# Patient Record
Sex: FEMALE | Race: WHITE | NOT HISPANIC OR LATINO | Employment: FULL TIME | URBAN - METROPOLITAN AREA
[De-identification: names, ages, dates, MRNs, and addresses within clinical notes are randomized per-mention and may not be internally consistent; named-entity substitution may affect disease eponyms.]

---

## 2020-02-13 ENCOUNTER — HOSPITAL ENCOUNTER (INPATIENT)
Facility: HOSPITAL | Age: 65
LOS: 4 days | Discharge: HOME | End: 2020-02-17
Attending: EMERGENCY MEDICINE | Admitting: INTERNAL MEDICINE

## 2020-02-13 DIAGNOSIS — F10.920 ALCOHOLIC INTOXICATION WITHOUT COMPLICATION (HCC): ICD-10-CM

## 2020-02-13 DIAGNOSIS — K92.0 GASTROINTESTINAL HEMORRHAGE WITH HEMATEMESIS: ICD-10-CM

## 2020-02-13 DIAGNOSIS — K92.2 GASTROINTESTINAL HEMORRHAGE, UNSPECIFIED GASTROINTESTINAL HEMORRHAGE TYPE: Primary | ICD-10-CM

## 2020-02-13 PROBLEM — B19.20 HEPATITIS C: Status: ACTIVE | Noted: 2020-02-13

## 2020-02-13 PROBLEM — F10.929 ALCOHOL INTOXICATION (HCC): Status: ACTIVE | Noted: 2020-02-13

## 2020-02-13 PROBLEM — IMO0001 ALCOHOLISM /ALCOHOL ABUSE: Status: ACTIVE | Noted: 2020-02-13

## 2020-02-13 PROBLEM — E07.9 DISEASE OF THYROID GLAND: Status: ACTIVE | Noted: 2020-02-13

## 2020-02-13 LAB
ABO GROUP BLD: NORMAL
ALBUMIN SERPL-MCNC: 4.1 G/DL (ref 3.5–5.2)
ALBUMIN/GLOB SERPL: 1.7 G/DL
ALP SERPL-CCNC: 134 U/L (ref 39–117)
ALT SERPL W P-5'-P-CCNC: 19 U/L (ref 1–33)
ANION GAP SERPL CALCULATED.3IONS-SCNC: 14.5 MMOL/L (ref 5–15)
AST SERPL-CCNC: 32 U/L (ref 1–32)
BASOPHILS # BLD AUTO: 0.04 10*3/MM3 (ref 0–0.2)
BASOPHILS NFR BLD AUTO: 0.5 % (ref 0–1.5)
BILIRUB SERPL-MCNC: <0.2 MG/DL (ref 0.2–1.2)
BLD GP AB SCN SERPL QL: NEGATIVE
BUN BLD-MCNC: 12 MG/DL (ref 8–23)
BUN/CREAT SERPL: 17.6 (ref 7–25)
CALCIUM SPEC-SCNC: 8.7 MG/DL (ref 8.6–10.5)
CHLORIDE SERPL-SCNC: 105 MMOL/L (ref 98–107)
CO2 SERPL-SCNC: 23.5 MMOL/L (ref 22–29)
CREAT BLD-MCNC: 0.68 MG/DL (ref 0.57–1)
D-LACTATE SERPL-SCNC: 1.9 MMOL/L (ref 0.5–2)
D-LACTATE SERPL-SCNC: 2.2 MMOL/L (ref 0.5–2)
DEPRECATED RDW RBC AUTO: 49 FL (ref 37–54)
DEVELOPER EXPIRATION DATE: ABNORMAL
DEVELOPER LOT NUMBER: 128
EOSINOPHIL # BLD AUTO: 0.2 10*3/MM3 (ref 0–0.4)
EOSINOPHIL NFR BLD AUTO: 2.6 % (ref 0.3–6.2)
ERYTHROCYTE [DISTWIDTH] IN BLOOD BY AUTOMATED COUNT: 14.3 % (ref 12.3–15.4)
ETHANOL BLD-MCNC: 275 MG/DL (ref 0–10)
ETHANOL UR QL: 0.28 %
EXPIRATION DATE: ABNORMAL
FECAL OCCULT BLOOD SCREEN, POC: POSITIVE
GFR SERPL CREATININE-BSD FRML MDRD: 87 ML/MIN/1.73
GLOBULIN UR ELPH-MCNC: 2.4 GM/DL
GLUCOSE BLD-MCNC: 90 MG/DL (ref 65–99)
HCT VFR BLD AUTO: 39.3 % (ref 34–46.6)
HGB BLD-MCNC: 13 G/DL (ref 12–15.9)
HOLD SPECIMEN: NORMAL
IMM GRANULOCYTES # BLD AUTO: 0.02 10*3/MM3 (ref 0–0.05)
IMM GRANULOCYTES NFR BLD AUTO: 0.3 % (ref 0–0.5)
INR PPP: 0.88 (ref 0.9–1.1)
LYMPHOCYTES # BLD AUTO: 2.48 10*3/MM3 (ref 0.7–3.1)
LYMPHOCYTES NFR BLD AUTO: 32.3 % (ref 19.6–45.3)
Lab: 128
MCH RBC QN AUTO: 31.6 PG (ref 26.6–33)
MCHC RBC AUTO-ENTMCNC: 33.1 G/DL (ref 31.5–35.7)
MCV RBC AUTO: 95.4 FL (ref 79–97)
MONOCYTES # BLD AUTO: 0.63 10*3/MM3 (ref 0.1–0.9)
MONOCYTES NFR BLD AUTO: 8.2 % (ref 5–12)
NEGATIVE CONTROL: NEGATIVE
NEUTROPHILS # BLD AUTO: 4.31 10*3/MM3 (ref 1.7–7)
NEUTROPHILS NFR BLD AUTO: 56.1 % (ref 42.7–76)
NRBC BLD AUTO-RTO: 0 /100 WBC (ref 0–0.2)
PLATELET # BLD AUTO: 319 10*3/MM3 (ref 140–450)
PMV BLD AUTO: 8.9 FL (ref 6–12)
POSITIVE CONTROL: POSITIVE
POTASSIUM BLD-SCNC: 3.8 MMOL/L (ref 3.5–5.2)
PROT SERPL-MCNC: 6.5 G/DL (ref 6–8.5)
PROTHROMBIN TIME: 11.7 SECONDS (ref 11.7–14.2)
RBC # BLD AUTO: 4.12 10*6/MM3 (ref 3.77–5.28)
RH BLD: POSITIVE
SODIUM BLD-SCNC: 143 MMOL/L (ref 136–145)
T&S EXPIRATION DATE: NORMAL
WBC NRBC COR # BLD: 7.68 10*3/MM3 (ref 3.4–10.8)
WHOLE BLOOD HOLD SPECIMEN: NORMAL
WHOLE BLOOD HOLD SPECIMEN: NORMAL

## 2020-02-13 PROCEDURE — 86900 BLOOD TYPING SEROLOGIC ABO: CPT | Performed by: EMERGENCY MEDICINE

## 2020-02-13 PROCEDURE — 99284 EMERGENCY DEPT VISIT MOD MDM: CPT

## 2020-02-13 PROCEDURE — 83605 ASSAY OF LACTIC ACID: CPT

## 2020-02-13 PROCEDURE — 85025 COMPLETE CBC W/AUTO DIFF WBC: CPT

## 2020-02-13 PROCEDURE — 85610 PROTHROMBIN TIME: CPT | Performed by: EMERGENCY MEDICINE

## 2020-02-13 PROCEDURE — 85018 HEMOGLOBIN: CPT | Performed by: INTERNAL MEDICINE

## 2020-02-13 PROCEDURE — 80053 COMPREHEN METABOLIC PANEL: CPT | Performed by: EMERGENCY MEDICINE

## 2020-02-13 PROCEDURE — 83605 ASSAY OF LACTIC ACID: CPT | Performed by: INTERNAL MEDICINE

## 2020-02-13 PROCEDURE — 80307 DRUG TEST PRSMV CHEM ANLYZR: CPT | Performed by: EMERGENCY MEDICINE

## 2020-02-13 PROCEDURE — 85014 HEMATOCRIT: CPT | Performed by: INTERNAL MEDICINE

## 2020-02-13 PROCEDURE — 25010000002 THIAMINE PER 100 MG: Performed by: INTERNAL MEDICINE

## 2020-02-13 PROCEDURE — 82270 OCCULT BLOOD FECES: CPT | Performed by: EMERGENCY MEDICINE

## 2020-02-13 PROCEDURE — 86901 BLOOD TYPING SEROLOGIC RH(D): CPT | Performed by: EMERGENCY MEDICINE

## 2020-02-13 PROCEDURE — 86850 RBC ANTIBODY SCREEN: CPT | Performed by: EMERGENCY MEDICINE

## 2020-02-13 PROCEDURE — 36415 COLL VENOUS BLD VENIPUNCTURE: CPT

## 2020-02-13 RX ORDER — LORAZEPAM 1 MG/1
1 TABLET ORAL EVERY 6 HOURS
Status: COMPLETED | OUTPATIENT
Start: 2020-02-13 | End: 2020-02-14

## 2020-02-13 RX ORDER — GABAPENTIN 300 MG/1
300 CAPSULE ORAL 3 TIMES DAILY
COMMUNITY

## 2020-02-13 RX ORDER — BUPROPION HYDROCHLORIDE 150 MG/1
300 TABLET ORAL DAILY
COMMUNITY

## 2020-02-13 RX ORDER — PANTOPRAZOLE SODIUM 40 MG/10ML
80 INJECTION, POWDER, LYOPHILIZED, FOR SOLUTION INTRAVENOUS ONCE
Status: COMPLETED | OUTPATIENT
Start: 2020-02-13 | End: 2020-02-13

## 2020-02-13 RX ORDER — THIAMINE MONONITRATE (VIT B1) 100 MG
100 TABLET ORAL ONCE
Status: COMPLETED | OUTPATIENT
Start: 2020-02-13 | End: 2020-02-13

## 2020-02-13 RX ORDER — SODIUM CHLORIDE 0.9 % (FLUSH) 0.9 %
10 SYRINGE (ML) INJECTION AS NEEDED
Status: DISCONTINUED | OUTPATIENT
Start: 2020-02-13 | End: 2020-02-17 | Stop reason: HOSPADM

## 2020-02-13 RX ORDER — LORAZEPAM 2 MG/ML
2 INJECTION INTRAMUSCULAR
Status: DISCONTINUED | OUTPATIENT
Start: 2020-02-13 | End: 2020-02-17 | Stop reason: HOSPADM

## 2020-02-13 RX ORDER — LORAZEPAM 2 MG/ML
1 INJECTION INTRAMUSCULAR
Status: DISCONTINUED | OUTPATIENT
Start: 2020-02-13 | End: 2020-02-17 | Stop reason: HOSPADM

## 2020-02-13 RX ORDER — TRAZODONE HYDROCHLORIDE 100 MG/1
100 TABLET ORAL NIGHTLY
COMMUNITY

## 2020-02-13 RX ORDER — ONDANSETRON 2 MG/ML
4 INJECTION INTRAMUSCULAR; INTRAVENOUS EVERY 6 HOURS PRN
Status: DISCONTINUED | OUTPATIENT
Start: 2020-02-13 | End: 2020-02-17 | Stop reason: HOSPADM

## 2020-02-13 RX ORDER — SODIUM CHLORIDE 9 MG/ML
125 INJECTION, SOLUTION INTRAVENOUS CONTINUOUS
Status: DISCONTINUED | OUTPATIENT
Start: 2020-02-13 | End: 2020-02-14

## 2020-02-13 RX ORDER — LORAZEPAM 1 MG/1
2 TABLET ORAL
Status: DISCONTINUED | OUTPATIENT
Start: 2020-02-13 | End: 2020-02-17 | Stop reason: HOSPADM

## 2020-02-13 RX ORDER — ESCITALOPRAM OXALATE 20 MG/1
20 TABLET ORAL DAILY
COMMUNITY

## 2020-02-13 RX ORDER — LORAZEPAM 1 MG/1
1 TABLET ORAL
Status: DISCONTINUED | OUTPATIENT
Start: 2020-02-13 | End: 2020-02-17 | Stop reason: HOSPADM

## 2020-02-13 RX ORDER — LORAZEPAM 1 MG/1
1 TABLET ORAL EVERY 8 HOURS
Status: DISPENSED | OUTPATIENT
Start: 2020-02-14 | End: 2020-02-15

## 2020-02-13 RX ORDER — SODIUM CHLORIDE 0.9 % (FLUSH) 0.9 %
10 SYRINGE (ML) INJECTION EVERY 12 HOURS SCHEDULED
Status: DISCONTINUED | OUTPATIENT
Start: 2020-02-13 | End: 2020-02-17 | Stop reason: HOSPADM

## 2020-02-13 RX ORDER — NITROGLYCERIN 0.4 MG/1
0.4 TABLET SUBLINGUAL
Status: DISCONTINUED | OUTPATIENT
Start: 2020-02-13 | End: 2020-02-17 | Stop reason: HOSPADM

## 2020-02-13 RX ORDER — NAPROXEN SODIUM 220 MG
440 TABLET ORAL
COMMUNITY
End: 2020-02-17 | Stop reason: HOSPADM

## 2020-02-13 RX ADMIN — SODIUM CHLORIDE, PRESERVATIVE FREE 10 ML: 5 INJECTION INTRAVENOUS at 21:35

## 2020-02-13 RX ADMIN — SODIUM CHLORIDE 1000 ML: 9 INJECTION, SOLUTION INTRAVENOUS at 15:50

## 2020-02-13 RX ADMIN — THIAMINE HYDROCHLORIDE 100 MG: 100 INJECTION, SOLUTION INTRAMUSCULAR; INTRAVENOUS at 22:26

## 2020-02-13 RX ADMIN — PANTOPRAZOLE SODIUM 80 MG: 40 INJECTION, POWDER, FOR SOLUTION INTRAVENOUS at 15:49

## 2020-02-13 RX ADMIN — SODIUM CHLORIDE 8 MG/HR: 900 INJECTION INTRAVENOUS at 21:29

## 2020-02-13 RX ADMIN — LORAZEPAM 1 MG: 1 TABLET ORAL at 21:29

## 2020-02-13 RX ADMIN — SODIUM CHLORIDE 125 ML/HR: 9 INJECTION, SOLUTION INTRAVENOUS at 16:26

## 2020-02-13 RX ADMIN — SODIUM CHLORIDE 8 MG/HR: 900 INJECTION INTRAVENOUS at 15:51

## 2020-02-13 NOTE — ED TRIAGE NOTES
"Patient reported to this triage nurse she is a heavy drinker and her last drink was early this am. Patient reported hx of vomiting blood and now passing blood in her stools. \"   "

## 2020-02-13 NOTE — ED PROVIDER NOTES
" EMERGENCY DEPARTMENT ENCOUNTER    CHIEF COMPLAINT  Chief Complaint: Hematemesis   History given by: Patient   History limited by: Pt is intoxicated   Room Number: 15/15  PMD: Provider, No Known      HPI:  Pt is a 64 y.o. female who presents complaining of bright red hematemesis that began 2 days ago. Pt is unsure of number or frequency of episodes. Pt is also c/o hematochezia, epigastric abdominal pain and light-headedness. Pt denies syncope. She describes the bloody stool as \"dark with black clots.\" Pt denies being anticoagulated. She denies previous similar episodes. Pt reports a fall due to intoxication several months ago and states she has had LLE pain ever since. Pt affirms hx of alcoholism with last drink this morning. She reports past IV drug use but denies any currently.     Duration:  2 days   Onset: Gradual   Timing: Unknown   Location: GI  Radiation: n/a  Quality: Hematemesis   Intensity/Severity: Moderate  Progression: Unchanged   Associated Symptoms: Hematochezia, abd pain, light-headedness   Aggravating Factors: None  Alleviating Factors: None  Previous Episodes: None  Treatment before arrival: None    PAST MEDICAL HISTORY  Active Ambulatory Problems     Diagnosis Date Noted   • No Active Ambulatory Problems     Resolved Ambulatory Problems     Diagnosis Date Noted   • No Resolved Ambulatory Problems     Past Medical History:   Diagnosis Date   • Alcoholic hepatitis    • Disease of thyroid gland    • Hepatitis C    • Hyperlipidemia        PAST SURGICAL HISTORY  History reviewed. No pertinent surgical history.    FAMILY HISTORY  History reviewed. No pertinent family history.    SOCIAL HISTORY  Social History     Socioeconomic History   • Marital status: Single     Spouse name: Not on file   • Number of children: Not on file   • Years of education: Not on file   • Highest education level: Not on file   Substance and Sexual Activity   • Alcohol use: Yes       ALLERGIES  Patient has no known " allergies.    REVIEW OF SYSTEMS  Review of Systems   Constitutional: Negative for fever.   HENT: Negative for sore throat.    Eyes: Negative.    Respiratory: Negative for cough and shortness of breath.    Cardiovascular: Negative for chest pain.   Gastrointestinal: Positive for abdominal pain, blood in stool and vomiting. Negative for diarrhea.   Genitourinary: Negative for dysuria.   Musculoskeletal: Positive for arthralgias. Negative for neck pain.   Skin: Negative for rash.   Allergic/Immunologic: Negative.    Neurological: Positive for light-headedness. Negative for syncope, weakness, numbness and headaches.   Hematological: Negative.    Psychiatric/Behavioral: Negative.    All other systems reviewed and are negative.      PHYSICAL EXAM  ED Triage Vitals   Temp Heart Rate Resp BP SpO2   02/13/20 1351 02/13/20 1351 02/13/20 1351 02/13/20 1428 02/13/20 1351   98.8 °F (37.1 °C) 89 16 129/90 93 %      Temp src Heart Rate Source Patient Position BP Location FiO2 (%)   02/13/20 1351 02/13/20 1351 02/13/20 1428 -- --   Tympanic Monitor Sitting         Physical Exam   Constitutional: No distress.   Smells of alcohol with slurred speech   HENT:   Head: Normocephalic and atraumatic.   Mouth/Throat: Mucous membranes are normal.   Eyes: Pupils are equal, round, and reactive to light.   Neck: Normal range of motion.   Cardiovascular: Normal rate and regular rhythm.   No murmur heard.  Pulmonary/Chest: Effort normal and breath sounds normal. No respiratory distress.   Abdominal: Soft. She exhibits no distension. Bowel sounds are hypoactive. There is tenderness in the epigastric area. There is no rebound and no guarding.   Genitourinary:   Genitourinary Comments: On rectal exam, dark stool that was heme positive (female scribe present for rectal exam)   Musculoskeletal: Normal range of motion. She exhibits no edema.   No pedal edema, L tibial tenderness   Neurological: She is alert. She has normal sensation and normal  strength.   No focal neurological deficits.   Skin: Skin is warm and dry.   Psychiatric: Affect normal.       LAB RESULTS  Lab Results (last 24 hours)     Procedure Component Value Units Date/Time    CBC & Differential [[id number]] Collected:  02/13/20 1438    Specimen:  Blood Updated:  02/13/20 1453    Narrative:       The following orders were created for panel order CBC & Differential.  Procedure                               Abnormality         Status                     ---------                               -----------         ------                     CBC Auto Differential[[id number]]        Normal              Final result                 Please view results for these tests on the individual orders.    Comprehensive Metabolic Panel [[id number]]  (Abnormal) Collected:  02/13/20 1438    Specimen:  Blood Updated:  02/13/20 1516     Glucose 90 mg/dL      BUN 12 mg/dL      Creatinine 0.68 mg/dL      Sodium 143 mmol/L      Potassium 3.8 mmol/L      Chloride 105 mmol/L      CO2 23.5 mmol/L      Calcium 8.7 mg/dL      Total Protein 6.5 g/dL      Albumin 4.10 g/dL      ALT (SGPT) 19 U/L      AST (SGOT) 32 U/L      Alkaline Phosphatase 134 U/L      Total Bilirubin <0.2 mg/dL      eGFR Non African Amer 87 mL/min/1.73      Globulin 2.4 gm/dL      A/G Ratio 1.7 g/dL      BUN/Creatinine Ratio 17.6     Anion Gap 14.5 mmol/L     Narrative:       GFR Normal >60  Chronic Kidney Disease <60  Kidney Failure <15      Lactic Acid, Plasma [[id number]]  (Abnormal) Collected:  02/13/20 1438    Specimen:  Blood Updated:  02/13/20 1508     Lactate 2.2 mmol/L     Ethanol [[id number]]  (Abnormal) Collected:  02/13/20 1438    Specimen:  Blood Updated:  02/13/20 1516     Ethanol 275 mg/dL      Ethanol % 0.275 %     CBC Auto Differential [[id number]]  (Normal) Collected:  02/13/20 1438    Specimen:  Blood Updated:  02/13/20 1453     WBC 7.68 10*3/mm3      RBC 4.12 10*6/mm3      Hemoglobin 13.0 g/dL      Hematocrit 39.3 %      MCV 95.4 fL       MCH 31.6 pg      MCHC 33.1 g/dL      RDW 14.3 %      RDW-SD 49.0 fl      MPV 8.9 fL      Platelets 319 10*3/mm3      Neutrophil % 56.1 %      Lymphocyte % 32.3 %      Monocyte % 8.2 %      Eosinophil % 2.6 %      Basophil % 0.5 %      Immature Grans % 0.3 %      Neutrophils, Absolute 4.31 10*3/mm3      Lymphocytes, Absolute 2.48 10*3/mm3      Monocytes, Absolute 0.63 10*3/mm3      Eosinophils, Absolute 0.20 10*3/mm3      Basophils, Absolute 0.04 10*3/mm3      Immature Grans, Absolute 0.02 10*3/mm3      nRBC 0.0 /100 WBC     Lactic Acid, Reflex Timer (This will reflex a repeat order 3-3:15 hours after ordered.) [[id number]] Collected:  02/13/20 1438    Specimen:  Blood Updated:  02/13/20 1815     Extra Tube Hold for add-ons.     Comment: Auto resulted.       Protime-INR [[id number]]  (Abnormal) Collected:  02/13/20 1438    Specimen:  Blood Updated:  02/13/20 1602     Protime 11.7 Seconds      INR 0.88    POCT Occult Blood, stool [[id number]]  (Abnormal) Collected:  02/13/20 1657    Specimen:  Stool from Per Rectum Updated:  02/13/20 1700     Fecal Occult Blood Positive     Lot Number 128     Expiration Date 5/31/20     DEVELOPER LOT NUMBER 128     DEVELOPER EXPIRATION DATE 5/31/20     Positive Control Positive     Negative Control Negative          I ordered the above labs and reviewed the results      PROCEDURES  Procedures      PROGRESS AND CONSULTS       4:58 PM: Rechecked pt who is resting comfortably and in NAD. Informed pt of all workup results thus far including normal HGB of 13.0. Performed rectal exam with female scribe present. Discussed plan to admit with GI consult.     5:15 PM: Spoke with  ([facility]) who agreed to admit to a tele bed     MEDICAL DECISION MAKING  Results were reviewed/discussed with the patient and they were also made aware of online access. Pt also made aware that some labs, such as cultures, will not be resulted during ER visit and follow up with PMD is necessary.     MDM  Number of  Diagnoses or Management Options  Alcoholic intoxication without complication (CMS/HCC):   Gastrointestinal hemorrhage, unspecified gastrointestinal hemorrhage type:      Amount and/or Complexity of Data Reviewed  Clinical lab tests: ordered and reviewed (Lactate 2.2, ethanol 275, heme positive POCT)  Discuss the patient with other providers: yes ( ([facility]))  Independent visualization of images, tracings, or specimens: yes    Patient Progress  Patient progress: stable         DIAGNOSIS  Final diagnoses:   Gastrointestinal hemorrhage, unspecified gastrointestinal hemorrhage type   Alcoholic intoxication without complication (CMS/HCC)       DISPOSITION  ADMISSION TO TELEMETRY     Discussed treatment plan and reason for admission with pt/family and admitting physician.  Pt/family voiced understanding of the plan for admission for further testing/treatment as needed.         Latest Documented Vital Signs:  As of 7:19 PM  BP- 151/78 HR- 79 Temp- 98.8 °F (37.1 °C) (Tympanic) O2 sat- 96%    --  Documentation assistance provided by john Holt for .  Information recorded by the scribe was done at my direction and has been verified and validated by me.                          Charisma Holt  02/13/20 1919       Reagan Herrera MD  02/13/20 2033

## 2020-02-14 ENCOUNTER — ANESTHESIA EVENT (OUTPATIENT)
Dept: GASTROENTEROLOGY | Facility: HOSPITAL | Age: 65
End: 2020-02-14

## 2020-02-14 ENCOUNTER — APPOINTMENT (OUTPATIENT)
Dept: ULTRASOUND IMAGING | Facility: HOSPITAL | Age: 65
End: 2020-02-14

## 2020-02-14 ENCOUNTER — ANESTHESIA (OUTPATIENT)
Dept: GASTROENTEROLOGY | Facility: HOSPITAL | Age: 65
End: 2020-02-14

## 2020-02-14 PROBLEM — Z79.1 NSAID LONG-TERM USE: Chronic | Status: ACTIVE | Noted: 2020-02-14

## 2020-02-14 PROBLEM — F41.8 DEPRESSION WITH ANXIETY: Chronic | Status: ACTIVE | Noted: 2020-02-14

## 2020-02-14 PROBLEM — R03.0 ELEVATED BP WITHOUT DIAGNOSIS OF HYPERTENSION: Status: ACTIVE | Noted: 2020-02-14

## 2020-02-14 PROBLEM — K92.0 GASTROINTESTINAL HEMORRHAGE WITH HEMATEMESIS: Status: ACTIVE | Noted: 2020-02-13

## 2020-02-14 LAB
HCT VFR BLD AUTO: 36.4 % (ref 34–46.6)
HCT VFR BLD AUTO: 38.5 % (ref 34–46.6)
HCT VFR BLD AUTO: 40 % (ref 34–46.6)
HGB BLD-MCNC: 12.2 G/DL (ref 12–15.9)
HGB BLD-MCNC: 13 G/DL (ref 12–15.9)
HGB BLD-MCNC: 13.2 G/DL (ref 12–15.9)

## 2020-02-14 PROCEDURE — 85018 HEMOGLOBIN: CPT | Performed by: INTERNAL MEDICINE

## 2020-02-14 PROCEDURE — 0DJ08ZZ INSPECTION OF UPPER INTESTINAL TRACT, VIA NATURAL OR ARTIFICIAL OPENING ENDOSCOPIC: ICD-10-PCS | Performed by: INTERNAL MEDICINE

## 2020-02-14 PROCEDURE — 85014 HEMATOCRIT: CPT | Performed by: INTERNAL MEDICINE

## 2020-02-14 PROCEDURE — 43235 EGD DIAGNOSTIC BRUSH WASH: CPT | Performed by: INTERNAL MEDICINE

## 2020-02-14 PROCEDURE — 25010000002 PROPOFOL 10 MG/ML EMULSION: Performed by: NURSE ANESTHETIST, CERTIFIED REGISTERED

## 2020-02-14 PROCEDURE — 87522 HEPATITIS C REVRS TRNSCRPJ: CPT | Performed by: INTERNAL MEDICINE

## 2020-02-14 PROCEDURE — 76705 ECHO EXAM OF ABDOMEN: CPT

## 2020-02-14 PROCEDURE — 99254 IP/OBS CNSLTJ NEW/EST MOD 60: CPT | Performed by: INTERNAL MEDICINE

## 2020-02-14 RX ORDER — PROPOFOL 10 MG/ML
VIAL (ML) INTRAVENOUS CONTINUOUS PRN
Status: DISCONTINUED | OUTPATIENT
Start: 2020-02-14 | End: 2020-02-14 | Stop reason: SURG

## 2020-02-14 RX ORDER — SODIUM CHLORIDE 9 MG/ML
30 INJECTION, SOLUTION INTRAVENOUS CONTINUOUS PRN
Status: DISCONTINUED | OUTPATIENT
Start: 2020-02-14 | End: 2020-02-17 | Stop reason: HOSPADM

## 2020-02-14 RX ORDER — PROPOFOL 10 MG/ML
VIAL (ML) INTRAVENOUS AS NEEDED
Status: DISCONTINUED | OUTPATIENT
Start: 2020-02-14 | End: 2020-02-14 | Stop reason: SURG

## 2020-02-14 RX ORDER — PANTOPRAZOLE SODIUM 40 MG/1
40 TABLET, DELAYED RELEASE ORAL
Status: DISCONTINUED | OUTPATIENT
Start: 2020-02-14 | End: 2020-02-15

## 2020-02-14 RX ORDER — CLONIDINE HYDROCHLORIDE 0.1 MG/1
0.1 TABLET ORAL EVERY 4 HOURS PRN
Status: DISCONTINUED | OUTPATIENT
Start: 2020-02-14 | End: 2020-02-16

## 2020-02-14 RX ORDER — LIDOCAINE HYDROCHLORIDE 20 MG/ML
INJECTION, SOLUTION INFILTRATION; PERINEURAL AS NEEDED
Status: DISCONTINUED | OUTPATIENT
Start: 2020-02-14 | End: 2020-02-14 | Stop reason: SURG

## 2020-02-14 RX ORDER — SODIUM CHLORIDE, SODIUM LACTATE, POTASSIUM CHLORIDE, CALCIUM CHLORIDE 600; 310; 30; 20 MG/100ML; MG/100ML; MG/100ML; MG/100ML
30 INJECTION, SOLUTION INTRAVENOUS CONTINUOUS
Status: DISCONTINUED | OUTPATIENT
Start: 2020-02-14 | End: 2020-02-14

## 2020-02-14 RX ADMIN — SODIUM CHLORIDE: 9 INJECTION, SOLUTION INTRAVENOUS at 11:32

## 2020-02-14 RX ADMIN — PROPOFOL 100 MG: 10 INJECTION, EMULSION INTRAVENOUS at 11:35

## 2020-02-14 RX ADMIN — SODIUM CHLORIDE 8 MG/HR: 900 INJECTION INTRAVENOUS at 02:52

## 2020-02-14 RX ADMIN — CLONIDINE HYDROCHLORIDE 0.1 MG: 0.1 TABLET ORAL at 09:42

## 2020-02-14 RX ADMIN — CLONIDINE HYDROCHLORIDE 0.1 MG: 0.1 TABLET ORAL at 14:08

## 2020-02-14 RX ADMIN — PANTOPRAZOLE SODIUM 40 MG: 40 TABLET, DELAYED RELEASE ORAL at 17:14

## 2020-02-14 RX ADMIN — LIDOCAINE HYDROCHLORIDE 60 MG: 20 INJECTION, SOLUTION INFILTRATION; PERINEURAL at 11:35

## 2020-02-14 RX ADMIN — LORAZEPAM 1 MG: 1 TABLET ORAL at 17:14

## 2020-02-14 RX ADMIN — PROPOFOL 250 MCG/KG/MIN: 10 INJECTION, EMULSION INTRAVENOUS at 11:35

## 2020-02-14 RX ADMIN — SODIUM CHLORIDE 8 MG/HR: 900 INJECTION INTRAVENOUS at 08:40

## 2020-02-14 RX ADMIN — CLONIDINE HYDROCHLORIDE 0.1 MG: 0.1 TABLET ORAL at 19:59

## 2020-02-14 RX ADMIN — SODIUM CHLORIDE, PRESERVATIVE FREE 10 ML: 5 INJECTION INTRAVENOUS at 20:00

## 2020-02-14 RX ADMIN — LORAZEPAM 1 MG: 1 TABLET ORAL at 08:40

## 2020-02-14 RX ADMIN — LORAZEPAM 1 MG: 1 TABLET ORAL at 02:52

## 2020-02-14 RX ADMIN — LORAZEPAM 1 MG: 1 TABLET ORAL at 19:59

## 2020-02-14 NOTE — H&P
Internal medicine history and physical  INTERNAL MEDICINE   [facility]       Patient Identification:  Name: Ronda Steve  Age: 64 y.o.  Sex: female  :  1955  MRN: [id number]                   Primary Care Physician: Provider, No Known                                   Chief Complaint: Sudden onset of abdominal pain the night before last followed by bloody vomiting episode and then subsequent bloody diarrhea off and on since then with last episode earlier today.    History of Present Illness:   Patient is a 64-year-old female who has history of hypothyroidism history of hepatitis C she reportedly recovered by herself as she was told 10 years ago by Dr. Marti that she does not have active infection, history of chronic alcohol use with varied amount of drinking on a daily basis consisting of beer and hard liquor throughout her life with last drink this afternoon was in her usual state of her health until night before last while drinking she developed significant abdominal pain followed by bloody emesis.  Subsequently she developed bloody diarrhea and has been having bloody stools since then.  Last episode occurred earlier today resulting in her being very concerned and decided that she needs to come to the emergency room.  Throughout this time patient has been drinking and her last drink was this afternoon.  Patient is very vague about describing her drinking habits.  Patient is adamant that she never gets alcohol withdrawal as she does not drink on a daily basis.      Past Medical History:  Past Medical History:   Diagnosis Date   • Alcoholic hepatitis    • Disease of thyroid gland    • Hepatitis C    • Hyperlipidemia      Past Surgical History:  History reviewed. No pertinent surgical history.   Home Meds:    (Not in a hospital admission)  Current Meds:     Current Facility-Administered Medications:   •  [COMPLETED] pantoprazole (PROTONIX) injection 80 mg, 80 mg, Intravenous, Once,  "80 mg at 02/13/20 1549 **AND** pantoprazole (PROTONIX) 40 mg/100 mL (0.4 mg/mL) in 0.9% NS IVPB, 8 mg/hr, Intravenous, Continuous, Reagan Herrera MD, Last Rate: 20 mL/hr at 02/13/20 1833, 8 mg/hr at 02/13/20 1833  •  sodium chloride 0.9 % flush 10 mL, 10 mL, Intravenous, PRN, Reagan Herrera MD  •  sodium chloride 0.9 % infusion, 125 mL/hr, Intravenous, Continuous, Reagan Herrera MD, Stopped at 02/13/20 1833  No current outpatient medications on file.  Allergies:  No Known Allergies  Social History:   Social History     Tobacco Use   • Smoking status: Not on file   Substance Use Topics   • Alcohol use: Yes      Family History:  History reviewed. No pertinent family history.       Review of Systems  See history of present illness and past medical history  Constitutional: Negative for fever.   HENT: Negative for sore throat.    Eyes: Negative.    Respiratory: Negative for cough and shortness of breath.    Cardiovascular: Negative for chest pain.   Gastrointestinal: Positive for abdominal pain, blood in stool and vomiting. Negative for diarrhea.   Genitourinary: Negative for dysuria.   Musculoskeletal: Positive for arthralgias. Negative for neck pain.   Skin: Negative for rash.   Allergic/Immunologic: Negative.    Neurological: Positive for light-headedness. Negative for syncope, weakness, numbness and headaches.   Hematological: Negative.    Psychiatric/Behavioral: Negative.    All other systems reviewed and are negative.      Vitals:   /78   Pulse 79   Temp 98.8 °F (37.1 °C) (Tympanic)   Resp 18   Ht 157.5 cm (62\")   Wt 70.3 kg (155 lb)   SpO2 96%   BMI 28.35 kg/m²   I/O:     Intake/Output Summary (Last 24 hours) at 2/13/2020 1911  Last data filed at 2/13/2020 1833  Gross per 24 hour   Intake 2000 ml   Output --   Net 2000 ml     Exam:  General Appearance:   Awake restless and fidgety female who does not appear to be in any acute distress   Head:    Normocephalic, without obvious abnormality, " atraumatic   Eyes:    PERRL, conjunctiva/corneas clear, EOM's intact, both eyes   Ears:    Normal external ear canals, both ears   Nose:   Nares normal, septum midline, mucosa normal, no drainage    or sinus tenderness   Throat:   Lips, tongue, gums normal; oral mucosa pink and moist   Neck:   Supple, symmetrical, trachea midline, no adenopathy;     thyroid:  no enlargement/tenderness/nodules; no carotid    bruit or JVD   Back:     Symmetric, no curvature, ROM normal, no CVA tenderness   Lungs:     Clear to auscultation bilaterally, respirations unlabored   Chest Wall:    No tenderness or deformity    Heart:    Regular rate and rhythm, S1 and S2 normal, no murmur, rub   or gallop   Abdomen:    Mild epigastric and upper quadrant tenderness.  Rectal examination performed in the emergency room revealed heme positive stool   Extremities:   Extremities normal, atraumatic, no cyanosis or edema   Pulses:   Pulses palpable in all extremities; symmetric all extremities   Skin:   Skin color normal, Skin is warm and dry,  no rashes or palpable lesions   Neurologic:  Grossly nonfocal       Data Review:      I reviewed the patient's new clinical results.  Results from last 7 days   Lab Units 02/13/20  1438   WBC 10*3/mm3 7.68   HEMOGLOBIN g/dL 13.0   PLATELETS 10*3/mm3 319     Results from last 7 days   Lab Units 02/13/20  1438   SODIUM mmol/L 143   POTASSIUM mmol/L 3.8   CHLORIDE mmol/L 105   CO2 mmol/L 23.5   BUN mg/dL 12   CREATININE mg/dL 0.68   CALCIUM mg/dL 8.7   GLUCOSE mg/dL 90     No radiology results for the last 30 days.  No orders to display   Blood alcohol level 275    Assessment:  Active Hospital Problems    Diagnosis POA   • **Gastrointestinal hemorrhage [K92.2] Yes   • Disease of thyroid gland [E07.9] Unknown   • Hepatitis C [B19.20] Unknown   • Alcohol intoxication (CMS/HCC) [F10.929] Unknown   • Alcoholism /alcohol abuse (CMS/HCC) [F10.20] Unknown       Medical decision making  Upper GI bleed-likely secondary  to multiple factors including chronic alcohol use.  Plan is to admit the patient check serial hemoglobin hematocrit transfuse if her hemoglobin is 7 or less keep her n.p.o. except for ice chips and medications and consult gastroenterology service.  Hypothyroidism-continue thyroid replacement therapy.  Chronic alcohol use with alcohol intoxication and at risk of alcohol withdrawal.  Plan is to admit the patient monitor as per CIWA protocol via treatment for alcohol withdrawal as needed.  Hypertension-likely reactive due to chronic alcohol use plan is to use clonidine if CIWA protocol related Ativan use does not control her blood pressure.    Aysha David MD   2/13/2020  7:11 PM  Much of this encounter note is an electronic transcription/translation of spoken language to printed text. The electronic translation of spoken language may permit erroneous, or at times, nonsensical words or phrases to be inadvertently transcribed; Although I have reviewed the note for such errors, some may still exist

## 2020-02-14 NOTE — PROGRESS NOTES
"   LOS: 1 day   Patient Care Team:  Provider, No Known as PCP - General    Chief Complaint: none at present    Subjective     Feels fine at present. Denies any further bleeding or N/V or abd pain. No HA or visual changes. Has no h/o elevated BPs.       Subjective:  Symptoms:  Resolved.  No shortness of breath, malaise, cough, chest pain, weakness, headache, chest pressure, anorexia, diarrhea or anxiety.    Diet:  Poor intake.  No nausea or vomiting.    Activity level: Normal.    Pain:  She reports no pain.        History taken from: patient chart RN    Objective     Vital Signs  Temp:  [97.6 °F (36.4 °C)-98.5 °F (36.9 °C)] 98.1 °F (36.7 °C)  Heart Rate:  [57-79] 57  Resp:  [16-18] 16  BP: (129-205)/() 199/98    Objective:  General Appearance:  Comfortable and in no acute distress (disheveled).    Vital signs: (most recent): Blood pressure (!) 199/98, pulse 57, temperature 98.1 °F (36.7 °C), temperature source Oral, resp. rate 16, height 157.5 cm (62\"), weight 70.3 kg (155 lb), SpO2 96 %.  Vital signs are normal.  No fever.  (BPs very high, labile).    Output: Producing urine and producing stool.    HEENT: Normal HEENT exam.    Lungs:  Normal effort and normal respiratory rate.  Breath sounds clear to auscultation.  She is not in respiratory distress.    Heart: Normal rate.  Regular rhythm.    Abdomen: Abdomen is soft.  Bowel sounds are normal.   There is no abdominal tenderness.   There is no mass. There is no hepatomegaly.   Extremities: There is no dependent edema.    Pulses: Distal pulses are intact.    Neurological: Patient is alert and oriented to person, place and time.  Normal strength.  Patient has normal muscle tone.    Pupils:  Pupils are equal, round, and reactive to light.  (No icterus).    Skin:  Warm and dry.  No rash.             Results Review:     I reviewed the patient's new clinical results.  I reviewed the patient's new imaging results and agree with the interpretation.  I reviewed the " patient's other test results and agree with the interpretation  I personally viewed and interpreted the patient's EKG/Telemetry data  Discussed with pt and RN    Results from last 7 days   Lab Units 02/14/20  0745 02/13/20  2344 02/13/20  1438   WBC 10*3/mm3  --   --  7.68   HEMOGLOBIN g/dL 13.2 12.2 13.0   PLATELETS 10*3/mm3  --   --  319     Results from last 7 days   Lab Units 02/13/20  1438   SODIUM mmol/L 143   POTASSIUM mmol/L 3.8   CHLORIDE mmol/L 105   CO2 mmol/L 23.5   BUN mg/dL 12   CREATININE mg/dL 0.68   CALCIUM mg/dL 8.7   Estimated Creatinine Clearance: 77.8 mL/min (by C-G formula based on SCr of 0.68 mg/dL).    Medication Review: reviewed    Assessment/Plan       Gastrointestinal hemorrhage with hematemesis    Hepatitis C    Alcohol intoxication (CMS/HCC)    Alcoholism /alcohol abuse (CMS/HCC)    Elevated BP without diagnosis of hypertension    Depression with anxiety    NSAID long-term use          Plan:   (GI Bleed with chronic NSAID use  -?source  -EGD unremarkable  -Hgb stable  -no further abd pain, N/V, hematemesis, melena, or BRBPR  -PO PPI    EtOH abuse, acute intoxication  -CIWA in place  -pt reluctant to acknowledge issue  -will ask Access to see    Elevated BP  -SBPs over 200 at times  -no h/o HTN per pt  -?withdrawal  -treating with PRN Clonidine for now    H/o Hep C  -elevated APhos  -liver U/S ordered  -Hep C viral load ordered    Depression/Anxiety  -continue home meds    Full code confirmed  SCDs for DVT ppx  Dispo: TBD).       Nico De La Fuente MD  02/14/20  2:27 PM    Time: 20min

## 2020-02-14 NOTE — ANESTHESIA POSTPROCEDURE EVALUATION
"Patient: Ronda Steve    Procedure Summary     Date:  02/14/20 Room / Location:   [facility]    Anesthesia Start:  1131 Anesthesia Stop:  1146    Procedure:  ESOPHAGOGASTRODUODENOSCOPY (N/A Esophagus) Diagnosis:       Gastrointestinal hemorrhage with hematemesis      (Gastrointestinal hemorrhage with hematemesis [K92.0])    Surgeon:  Jordan Iqbal MD Provider:  Jonny Springer MD    Anesthesia Type:  MAC ASA Status:  3          Anesthesia Type: MAC    Vitals  Vitals Value Taken Time   /65 2/14/2020 12:08 PM   Temp     Pulse 68 2/14/2020 12:08 PM   Resp 16 2/14/2020 12:08 PM   SpO2 96 % 2/14/2020 12:08 PM           Post Anesthesia Care and Evaluation    Patient location during evaluation: bedside  Patient participation: complete - patient participated  Level of consciousness: awake  Pain score: 2  Pain management: adequate  Airway patency: patent  Anesthetic complications: No anesthetic complications  PONV Status: none  Cardiovascular status: acceptable  Respiratory status: acceptable  Hydration status: acceptable    Comments: BP (!) 199/98   Pulse 57   Temp 36.7 °C (98.1 °F) (Oral)   Resp 16   Ht 157.5 cm (62\")   Wt 70.3 kg (155 lb)   SpO2 96%   BMI 28.35 kg/m²         "

## 2020-02-14 NOTE — CONSULTS
"[facility] Note.    Chart reviewed.     Upon approach found Pt sitting in bed eating dinner meal. Introduced self and explained role. Pt quickly said, \"No thanks.\" This writer again asked if Pt was willing to talk, \"No thanks.\"     Spoke with RNCharlotte and informed RN that Pt refused [facility] consult.     Access to sign off. Can be re-consulted if Pt interested.   "

## 2020-02-14 NOTE — ANESTHESIA PREPROCEDURE EVALUATION
Anesthesia Evaluation     Patient summary reviewed                Airway   Mallampati: II  No difficulty expected  Dental      Pulmonary    Cardiovascular     Rhythm: regular    (+) hyperlipidemia,       Neuro/Psych  GI/Hepatic/Renal/Endo    (+)  GI bleeding , hepatitis,     Musculoskeletal     Abdominal    Substance History   (+) alcohol use,      OB/GYN          Other                        Anesthesia Plan    ASA 3     MAC       Anesthetic plan, all risks, benefits, and alternatives have been provided, discussed and informed consent has been obtained with: patient.  Use of blood products discussed with patient .

## 2020-02-14 NOTE — PLAN OF CARE
Problem: Patient Care Overview  Goal: Plan of Care Review  2/14/2020 0424 by Gretel Padgett RN  Outcome: Ongoing (interventions implemented as appropriate)  Flowsheets (Taken 2/14/2020 0424)  Progress: improving  Plan of Care Reviewed With: patient  Outcome Summary: Pt was admitted from ER, alert & oriented x4, vss, CIWA protocol in progress, no active bleeding seen or any distress noticed, awaiting GI consult in am, I will continue to monitor.  2/14/2020 0423 by Gretel Padgett RN  Outcome: Ongoing (interventions implemented as appropriate)  Flowsheets (Taken 2/14/2020 0423)  Progress: improving  Plan of Care Reviewed With: patient  Goal: Individualization and Mutuality  2/14/2020 0424 by Gretel Padgett RN  Outcome: Ongoing (interventions implemented as appropriate)  2/14/2020 0423 by Gretel Padgett RN  Outcome: Ongoing (interventions implemented as appropriate)  Goal: Discharge Needs Assessment  2/14/2020 0424 by Gretel Padgett RN  Outcome: Ongoing (interventions implemented as appropriate)  2/14/2020 0423 by Gretel Padgett RN  Outcome: Ongoing (interventions implemented as appropriate)  Goal: Interprofessional Rounds/Family Conf  2/14/2020 0424 by Gretel Padgett RN  Outcome: Ongoing (interventions implemented as appropriate)  2/14/2020 0423 by Gretel Padgett RN  Outcome: Ongoing (interventions implemented as appropriate)     Problem: Pain, Chronic (Adult)  Goal: Identify Related Risk Factors and Signs and Symptoms  2/14/2020 0424 by Gretel Padgett RN  Outcome: Ongoing (interventions implemented as appropriate)  2/14/2020 0423 by Gretel Padgett RN  Outcome: Ongoing (interventions implemented as appropriate)  Goal: Acceptable Pain/Comfort Level and Functional Ability  2/14/2020 0424 by Gretel Padgett RN  Outcome: Ongoing (interventions implemented as appropriate)  2/14/2020 0423 by Gretel Padgett RN  Outcome: Ongoing (interventions implemented as appropriate)     Problem:  Gastrointestinal Bleeding (Adult)  Goal: Signs and Symptoms of Listed Potential Problems Will be Absent, Minimized or Managed (Gastrointestinal Bleeding)  2/14/2020 0424 by Gretel Padgett RN  Outcome: Ongoing (interventions implemented as appropriate)  2/14/2020 0423 by Gretel Padgett RN  Outcome: Ongoing (interventions implemented as appropriate)     Problem: Fall Risk (Adult)  Goal: Identify Related Risk Factors and Signs and Symptoms  2/14/2020 0424 by Gretel Padgett RN  Outcome: Ongoing (interventions implemented as appropriate)  2/14/2020 0423 by Gretel Padgett RN  Outcome: Ongoing (interventions implemented as appropriate)  Goal: Absence of Fall  2/14/2020 0424 by Gretel Padgett RN  Outcome: Ongoing (interventions implemented as appropriate)  2/14/2020 0423 by Gretel Padgett RN  Outcome: Ongoing (interventions implemented as appropriate)

## 2020-02-14 NOTE — PROGRESS NOTES
Discharge Planning Assessment  [facility]     Patient Name: Ronda Steve  MRN: [id number]  Today's Date: 2/14/2020    Admit Date: 2/13/2020    Discharge Needs Assessment     Row Name 02/14/20 5129       Living Environment    Lives With  alone    Current Living Arrangements  home/apartment/condo    Primary Care Provided by  self    Provides Primary Care For  no one    Family Caregiver if Needed  sibling(s);parent(s)    Quality of Family Relationships  supportive;helpful    Able to Return to Prior Arrangements  yes       Transition Planning    Patient/Family Anticipates Transition to  home    Patient/Family Anticipated Services at Transition  none    Transportation Anticipated  family or friend will provide       Discharge Needs Assessment    Readmission Within the Last 30 Days  no previous admission in last 30 days    Concerns to be Addressed  denies needs/concerns at this time    Equipment Currently Used at Home  none    Equipment Needed After Discharge  none    Provided post acute provider list?  N/A        Discharge Plan     Row Name 02/14/20 4052       Plan    Plan  Pt plans to return home upon DC.  CCP will continue to follow and assist if needed.    Plan Comments  Spoke with pt at bedside. Facesheet and pharmacy onfo confirmed. Pt lives alone in a condo and is IADLs.. SHe does not drive.  Her sister or a friend provides transport as needed.  Her step father is her emergence contact.  She has no DME and has never been to SNF.  SHe has had HH in the past but does not recall which HH agency. She manages her own meds and denies trouble affording them.  Pt does not have a PCP. She would like to find a physician in the [facility].  [facility] appt liason contact info given.  Pt denies issues with mobility and plans to return home upon DC.         Destination      Coordination has not been started for this encounter.      Durable Medical Equipment      Coordination has not been started for this encounter.       Dialysis/Infusion      Coordination has not been started for this encounter.      Home Medical Care      Coordination has not been started for this encounter.      Therapy      Coordination has not been started for this encounter.      Community Resources      Coordination has not been started for this encounter.          Demographic Summary     Row Name 02/14/20 1728       General Information    Admission Type  inpatient    Arrived From  home    Referral Source  admission list    Reason for Consult  discharge planning    Preferred Language  English        Functional Status     Row Name 02/14/20 1728       Functional Status    Usual Activity Tolerance  good    Current Activity Tolerance  good       Functional Status, IADL    Medications  independent    Meal Preparation  independent    Housekeeping  independent    Laundry  independent    Shopping  independent       Mental Status    General Appearance WDL  WDL       Employment/    Employment Status  unemployed        Psychosocial    No documentation.       Abuse/Neglect    No documentation.       Legal    No documentation.       Substance Abuse    No documentation.       Patient Forms    No documentation.           Alaina Weems RN

## 2020-02-14 NOTE — CONSULTS
"[facility]  Initial Inpatient Consult Note    Referring Provider: Dr David    Reason for Consultation: hematemesis, melena    Subjective     History of present illness:      Thank you for requesting my opinion.    This is a 64-year-old alcoholic who was admitted through the emergency room with reports of hematemesis.  She reports that symptoms started about 2 days ago.  She reports that she was in her normal state of health and then 2 days ago felt nauseated and vomited.  She said it was a mix of bright red blood as well as some clots.  She also reports that she has had some melenic stools.  Last episode of hematemesis was yesterday morning.  She denies any further episodes.  Symptoms associated with some mild abdominal discomfort.  She denies that she has had symptoms like this before.  She is a chronic alcoholic and says she drinks at least 4 beers and vodka every day.  She came in intoxicated.  She also endorses that she takes 4 Aleve a day.    She reports a history of hepatitis C but says that she was told that she had cleared the infection on her own.  She is never had treatment for this.    She does smoke half a pack of cigarettes per day.  She claims that she has had a colonoscopy \"within the past 10 years.\"  Review the chart shows some pathology from 2014 indicating that she had an EGD at that time.    She denies any family history of GI malignancies    She says that she lives alone and independently    She says that she does not work.  She says that she lives off of a trust fund.    Review of labs indicate an alkaline phosphatase level of 134 hemoglobin of 12.2, slightly declined from 13 yesterday afternoon.    Recent imaging    Past Medical History:  Past Medical History:   Diagnosis Date   • Alcoholic hepatitis    • Disease of thyroid gland    • Hepatitis C    • Hyperlipidemia        Past Surgical History:  History reviewed. No pertinent surgical history.     Social History: "   Social History     Tobacco Use   • Smoking status: Current Every Day Smoker     Packs/day: 0.50     Years: 48.00     Pack years: 24.00   • Smokeless tobacco: Never Used   Substance Use Topics   • Alcohol use: Yes     Alcohol/week: 36.0 standard drinks     Types: 35 Cans of beer, 1 Shots of liquor per week        Family History:  History reviewed. No pertinent family history.    Home Meds:  Medications Prior to Admission   Medication Sig Dispense Refill Last Dose   • buPROPion XL (WELLBUTRIN XL) 150 MG 24 hr tablet Take 300 mg by mouth Daily.      • escitalopram (LEXAPRO) 20 MG tablet Take 20 mg by mouth Daily.      • gabapentin (NEURONTIN) 300 MG capsule Take 300 mg by mouth 3 (Three) Times a Day.      • naproxen sodium (ALEVE) 220 MG tablet Take 440 mg by mouth 3 (Three) Times a Day With Meals.      • traZODone (DESYREL) 100 MG tablet Take 100 mg by mouth Every Night.          Current Meds:     LORazepam 1 mg Oral Q6H   Followed by      LORazepam 1 mg Oral Q8H   sodium chloride 10 mL Intravenous Q12H       Allergies:  No Known Allergies    Review of Systems  All systems were reviewed and negative except for:  Gastrointestinal: positive for  hematemesis, melena and nausea     Objective     Vital Signs  Temp:  [97.6 °F (36.4 °C)-98.8 °F (37.1 °C)] 97.9 °F (36.6 °C)  Heart Rate:  [70-89] 70  Resp:  [16-18] 18  BP: (129-176)/() 168/102    Physical Exam:  Constitutional:    Alert, cooperative, in no acute distress, appears stated age, disheveled   Eyes:            Lids and lashes normal, conjunctivae and sclerae normal, no   icterus   Ears, nose, mouth and throat:   Normal appearance of external ears and nose, no oral lesions, no thrush, oral mucosa moist   Respiratory:     Clear to auscultation, respirations regular, even and                   unlabored    Cardiovascular:    Regular rhythm and normal rate, normal S1 and S2, no            murmur, no gallop, palpable distal pulses, no lower extremity edema    Gastrointestinal:    Soft, non-distended, non-tender to palpation, no guarding, no rebound tenderness, normal bowel sounds, no palpable masses or organomegaly  Rectal exam: deferred   Musculoskeletal:   Normal station, no atrophy, no tenderness to palpation, normal digits and nails   Skin:   Normal color, no bleeding, bruising, rashes or lesions   Lymphatics:   No palpable cervical or supraclavicular adenopathy   Psychiatric:  Judgement and insight: Limited   Orientation to person, place and time: normal   Mood and affect: normal       Results Review:   I reviewed the patient's new clinical results.    Results from last 7 days   Lab Units 02/13/20  2344 02/13/20  1438   WBC 10*3/mm3  --  7.68   HEMOGLOBIN g/dL 12.2 13.0   HEMATOCRIT % 36.4 39.3   PLATELETS 10*3/mm3  --  319       Results from last 7 days   Lab Units 02/13/20  1438   SODIUM mmol/L 143   POTASSIUM mmol/L 3.8   CHLORIDE mmol/L 105   CO2 mmol/L 23.5   BUN mg/dL 12   CREATININE mg/dL 0.68   CALCIUM mg/dL 8.7   BILIRUBIN mg/dL <0.2*   ALK PHOS U/L 134*   ALT (SGPT) U/L 19   AST (SGOT) U/L 32   GLUCOSE mg/dL 90       Results from last 7 days   Lab Units 02/13/20  1438   INR  0.88*       Lab Results   Lab Value Date/Time    LIPASE 19 04/30/2014 0437    LIPASE 28 04/28/2014 1100       Radiology:  Imaging Results (Last 72 Hours)     ** No results found for the last 72 hours. **          Assessment/Plan       Gastrointestinal hemorrhage    Disease of thyroid gland    Hepatitis C    Alcohol intoxication (CMS/HCC)    Alcoholism /alcohol abuse (CMS/HCC)      Impression  1.  Hematemesis with melena: Very minimal decline in her hemoglobin, suspect gastritis versus esophagitis versus ulceration    2.  Elevated alkaline phosphatase, minimally elevated ALT: Setting of ongoing chronic alcohol abuse    3.  History of hepatitis C: She reports no active infection    4.  Chronic alcoholism: Admitted intoxicated    5.  Excessive NSAID use: She says she takes 4 Aleve a  day    Plan  Proceed with EGD for further evaluation of the hematemesis and melena  Liver ultrasound  Hepatitis C viral load  Continue PPI  Follow hemoglobin  Discussed need for alcohol cessation, she denies that she wants to go to AA or that she needs any kind of structured rehab program  Continue CIWA protocol    I discussed the patients findings and my recommendations with patient    Mia Pfeiffer MD  [facility]      Dictated utilizing Dragon dictation

## 2020-02-15 ENCOUNTER — APPOINTMENT (OUTPATIENT)
Dept: ULTRASOUND IMAGING | Facility: HOSPITAL | Age: 65
End: 2020-02-15

## 2020-02-15 LAB
ALBUMIN SERPL-MCNC: 3.8 G/DL (ref 3.5–5.2)
ALBUMIN/GLOB SERPL: 1.4 G/DL
ALP SERPL-CCNC: 130 U/L (ref 39–117)
ALT SERPL W P-5'-P-CCNC: 16 U/L (ref 1–33)
ANION GAP SERPL CALCULATED.3IONS-SCNC: 13.7 MMOL/L (ref 5–15)
AST SERPL-CCNC: 24 U/L (ref 1–32)
BASOPHILS # BLD AUTO: 0.04 10*3/MM3 (ref 0–0.2)
BASOPHILS NFR BLD AUTO: 0.6 % (ref 0–1.5)
BILIRUB SERPL-MCNC: 0.4 MG/DL (ref 0.2–1.2)
BUN BLD-MCNC: 16 MG/DL (ref 8–23)
BUN/CREAT SERPL: 26.7 (ref 7–25)
CALCIUM SPEC-SCNC: 9.4 MG/DL (ref 8.6–10.5)
CHLORIDE SERPL-SCNC: 101 MMOL/L (ref 98–107)
CO2 SERPL-SCNC: 25.3 MMOL/L (ref 22–29)
CREAT BLD-MCNC: 0.6 MG/DL (ref 0.57–1)
DEPRECATED RDW RBC AUTO: 49 FL (ref 37–54)
EOSINOPHIL # BLD AUTO: 0.14 10*3/MM3 (ref 0–0.4)
EOSINOPHIL NFR BLD AUTO: 2.2 % (ref 0.3–6.2)
ERYTHROCYTE [DISTWIDTH] IN BLOOD BY AUTOMATED COUNT: 14.3 % (ref 12.3–15.4)
GFR SERPL CREATININE-BSD FRML MDRD: 101 ML/MIN/1.73
GLOBULIN UR ELPH-MCNC: 2.7 GM/DL
GLUCOSE BLD-MCNC: 97 MG/DL (ref 65–99)
HCT VFR BLD AUTO: 38.2 % (ref 34–46.6)
HCT VFR BLD AUTO: 41.3 % (ref 34–46.6)
HCT VFR BLD AUTO: 41.3 % (ref 34–46.6)
HGB BLD-MCNC: 12.9 G/DL (ref 12–15.9)
HGB BLD-MCNC: 13.7 G/DL (ref 12–15.9)
HGB BLD-MCNC: 13.7 G/DL (ref 12–15.9)
IMM GRANULOCYTES # BLD AUTO: 0.02 10*3/MM3 (ref 0–0.05)
IMM GRANULOCYTES NFR BLD AUTO: 0.3 % (ref 0–0.5)
LYMPHOCYTES # BLD AUTO: 1.57 10*3/MM3 (ref 0.7–3.1)
LYMPHOCYTES NFR BLD AUTO: 24.6 % (ref 19.6–45.3)
MCH RBC QN AUTO: 31.3 PG (ref 26.6–33)
MCHC RBC AUTO-ENTMCNC: 33.2 G/DL (ref 31.5–35.7)
MCV RBC AUTO: 94.3 FL (ref 79–97)
MONOCYTES # BLD AUTO: 0.74 10*3/MM3 (ref 0.1–0.9)
MONOCYTES NFR BLD AUTO: 11.6 % (ref 5–12)
NEUTROPHILS # BLD AUTO: 3.86 10*3/MM3 (ref 1.7–7)
NEUTROPHILS NFR BLD AUTO: 60.7 % (ref 42.7–76)
NRBC BLD AUTO-RTO: 0 /100 WBC (ref 0–0.2)
PLATELET # BLD AUTO: 309 10*3/MM3 (ref 140–450)
PMV BLD AUTO: 9.3 FL (ref 6–12)
POTASSIUM BLD-SCNC: 4 MMOL/L (ref 3.5–5.2)
PROT SERPL-MCNC: 6.5 G/DL (ref 6–8.5)
RBC # BLD AUTO: 4.38 10*6/MM3 (ref 3.77–5.28)
SODIUM BLD-SCNC: 140 MMOL/L (ref 136–145)
WBC NRBC COR # BLD: 6.37 10*3/MM3 (ref 3.4–10.8)

## 2020-02-15 PROCEDURE — 76775 US EXAM ABDO BACK WALL LIM: CPT

## 2020-02-15 PROCEDURE — 85014 HEMATOCRIT: CPT | Performed by: INTERNAL MEDICINE

## 2020-02-15 PROCEDURE — 80053 COMPREHEN METABOLIC PANEL: CPT | Performed by: HOSPITALIST

## 2020-02-15 PROCEDURE — 85018 HEMOGLOBIN: CPT | Performed by: INTERNAL MEDICINE

## 2020-02-15 PROCEDURE — 85025 COMPLETE CBC W/AUTO DIFF WBC: CPT | Performed by: HOSPITALIST

## 2020-02-15 RX ORDER — PANTOPRAZOLE SODIUM 40 MG/1
40 TABLET, DELAYED RELEASE ORAL
Status: DISCONTINUED | OUTPATIENT
Start: 2020-02-16 | End: 2020-02-15

## 2020-02-15 RX ORDER — ESCITALOPRAM OXALATE 20 MG/1
20 TABLET ORAL DAILY
Status: DISCONTINUED | OUTPATIENT
Start: 2020-02-15 | End: 2020-02-17 | Stop reason: HOSPADM

## 2020-02-15 RX ORDER — BUPROPION HYDROCHLORIDE 300 MG/1
300 TABLET ORAL DAILY
Status: DISCONTINUED | OUTPATIENT
Start: 2020-02-15 | End: 2020-02-17 | Stop reason: HOSPADM

## 2020-02-15 RX ORDER — LISINOPRIL 20 MG/1
20 TABLET ORAL
Status: DISCONTINUED | OUTPATIENT
Start: 2020-02-15 | End: 2020-02-17 | Stop reason: HOSPADM

## 2020-02-15 RX ORDER — TRAZODONE HYDROCHLORIDE 100 MG/1
100 TABLET ORAL NIGHTLY
Status: DISCONTINUED | OUTPATIENT
Start: 2020-02-15 | End: 2020-02-17 | Stop reason: HOSPADM

## 2020-02-15 RX ORDER — GABAPENTIN 300 MG/1
300 CAPSULE ORAL 3 TIMES DAILY
Status: DISCONTINUED | OUTPATIENT
Start: 2020-02-15 | End: 2020-02-17 | Stop reason: HOSPADM

## 2020-02-15 RX ADMIN — GABAPENTIN 300 MG: 300 CAPSULE ORAL at 11:59

## 2020-02-15 RX ADMIN — SODIUM CHLORIDE, PRESERVATIVE FREE 10 ML: 5 INJECTION INTRAVENOUS at 20:11

## 2020-02-15 RX ADMIN — LISINOPRIL 20 MG: 20 TABLET ORAL at 11:59

## 2020-02-15 RX ADMIN — CLONIDINE HYDROCHLORIDE 0.1 MG: 0.1 TABLET ORAL at 06:43

## 2020-02-15 RX ADMIN — GABAPENTIN 300 MG: 300 CAPSULE ORAL at 20:10

## 2020-02-15 RX ADMIN — CLONIDINE HYDROCHLORIDE 0.1 MG: 0.1 TABLET ORAL at 00:02

## 2020-02-15 RX ADMIN — CLONIDINE HYDROCHLORIDE 0.1 MG: 0.1 TABLET ORAL at 12:01

## 2020-02-15 RX ADMIN — BUPROPION HYDROCHLORIDE 300 MG: 300 TABLET, EXTENDED RELEASE ORAL at 11:59

## 2020-02-15 RX ADMIN — ESCITALOPRAM 20 MG: 20 TABLET, FILM COATED ORAL at 11:59

## 2020-02-15 RX ADMIN — PANTOPRAZOLE SODIUM 40 MG: 40 TABLET, DELAYED RELEASE ORAL at 08:24

## 2020-02-15 RX ADMIN — GABAPENTIN 300 MG: 300 CAPSULE ORAL at 15:59

## 2020-02-15 RX ADMIN — LORAZEPAM 1 MG: 1 TABLET ORAL at 11:59

## 2020-02-15 RX ADMIN — CLONIDINE HYDROCHLORIDE 0.1 MG: 0.1 TABLET ORAL at 15:59

## 2020-02-15 RX ADMIN — SODIUM CHLORIDE, PRESERVATIVE FREE 10 ML: 5 INJECTION INTRAVENOUS at 08:24

## 2020-02-15 RX ADMIN — TRAZODONE HYDROCHLORIDE 100 MG: 100 TABLET ORAL at 20:10

## 2020-02-15 NOTE — PLAN OF CARE
Problem: Patient Care Overview  Goal: Plan of Care Review  Outcome: Ongoing (interventions implemented as appropriate)  Flowsheets  Taken 2/15/2020 0559 by Nico Conrad, RN  Progress: no change  Taken 2/15/2020 1851 by Shanique Cox, RN  Plan of Care Reviewed With: patient   Pt. CIWA had been negative. BP is still elevated and patient was started on lisinopril and is still getting PRN clonidine. Pt. Had a renal ultrasound today and it was negative. If BP is controlled better of the night pt will discharge home tomorrow

## 2020-02-15 NOTE — PROGRESS NOTES
LOS: 2 days     Name: Ronda Steve  Age/Sex: 64 y.o. female  :  1955        PCP: Provider, No Known  Chief Complaint   Patient presents with   • GI Bleeding      Subjective   She is feeling okay denies any new issues or complaints.  Denies headache vision changes or new neurologic symptoms.  No other bleeding overnight.  General: No Fever or Chills, Cardiac: No Chest Pain or Palpitations, Resp: No Cough or SOA, GI: No Nausea, Vomiting, or Diarrhea and Other: No bleeding      buPROPion  mg Oral Daily   escitalopram 20 mg Oral Daily   gabapentin 300 mg Oral TID   lisinopril 20 mg Oral Q24H   LORazepam 1 mg Oral Q8H   pantoprazole 40 mg Oral BID AC   sodium chloride 10 mL Intravenous Q12H   traZODone 100 mg Oral Nightly       sodium chloride 30 mL/hr       Objective   Vital Signs  Temp:  [97.3 °F (36.3 °C)-98.1 °F (36.7 °C)] 98.1 °F (36.7 °C)  Heart Rate:  [57-68] 59  Resp:  [16-18] 18  BP: (147-199)/() 187/97  Body mass index is 27.98 kg/m².  No intake or output data in the 24 hours ending 02/15/20 1156    Physical Exam   Constitutional: She is oriented to person, place, and time. She appears well-developed and well-nourished. No distress.   HENT:   Head: Normocephalic and atraumatic.   Cardiovascular: Normal rate and regular rhythm.   Pulmonary/Chest: Effort normal and breath sounds normal.   Abdominal: Soft. Bowel sounds are normal.   Neurological: She is alert and oriented to person, place, and time.   Skin: Skin is warm and dry. Capillary refill takes less than 2 seconds.   Psychiatric: She has a normal mood and affect. Her behavior is normal.   Nursing note and vitals reviewed.        Results Review:       I reviewed the patient's new clinical results.  Results from last 7 days   Lab Units 02/15/20  0707 02/15/20  0012 20  1550 20  0745 20  2344 20  1438   WBC 10*3/mm3 6.37  --   --   --   --  7.68   HEMOGLOBIN g/dL 13.7  13.7 12.9 13.0 13.2 12.2 13.0      PLATELETS 10*3/mm3 309  --   --   --   --  319     Results from last 7 days   Lab Units 02/15/20  0707 02/13/20  1438   SODIUM mmol/L 140 143   POTASSIUM mmol/L 4.0 3.8   CHLORIDE mmol/L 101 105   CO2 mmol/L 25.3 23.5   BUN mg/dL 16 12   CREATININE mg/dL 0.60 0.68   CALCIUM mg/dL 9.4 8.7   Estimated Creatinine Clearance: 87.6 mL/min (by C-G formula based on SCr of 0.6 mg/dL).      Assessment/Plan     Gastrointestinal hemorrhage with hematemesis    Hepatitis C    Alcohol intoxication (CMS/HCC)    Alcoholism /alcohol abuse (CMS/HCC)    Elevated BP without diagnosis of hypertension    Depression with anxiety    NSAID long-term use      PLAN  -Hemoglobin remained stable  -Endoscopy negative  -Blood pressure remains significantly elevated.  Even despite PRN doses of clonidine which she is received all available doses her blood pressure remains significantly high.  -Plan at this point is to start her on lisinopril we will check a renal artery ultrasound today.  -Hopefully with additional medications her blood pressure comes down and we can get her out of here tomorrow.      Disposition  Hopefully home tomorrow      Jeevan Mejia MD  [facility]  02/15/20  11:56 AM

## 2020-02-15 NOTE — PLAN OF CARE
Problem: Patient Care Overview  Goal: Plan of Care Review  Outcome: Ongoing (interventions implemented as appropriate)  Flowsheets (Taken 2/15/2020 4906)  Progress: no change  Plan of Care Reviewed With: patient  Outcome Summary: Pt AO, VSS on RA in Sinus and Bradycardia rhythms, PRN Clonidine provided for SBP over 160 as ordered, CIWA 0,

## 2020-02-16 LAB
ANION GAP SERPL CALCULATED.3IONS-SCNC: 12 MMOL/L (ref 5–15)
BUN BLD-MCNC: 17 MG/DL (ref 8–23)
BUN/CREAT SERPL: 20.2 (ref 7–25)
CALCIUM SPEC-SCNC: 9.6 MG/DL (ref 8.6–10.5)
CHLORIDE SERPL-SCNC: 103 MMOL/L (ref 98–107)
CO2 SERPL-SCNC: 26 MMOL/L (ref 22–29)
CREAT BLD-MCNC: 0.84 MG/DL (ref 0.57–1)
DEPRECATED RDW RBC AUTO: 48.8 FL (ref 37–54)
ERYTHROCYTE [DISTWIDTH] IN BLOOD BY AUTOMATED COUNT: 14.4 % (ref 12.3–15.4)
GFR SERPL CREATININE-BSD FRML MDRD: 68 ML/MIN/1.73
GLUCOSE BLD-MCNC: 117 MG/DL (ref 65–99)
HCT VFR BLD AUTO: 40.1 % (ref 34–46.6)
HGB BLD-MCNC: 13.2 G/DL (ref 12–15.9)
MCH RBC QN AUTO: 31.1 PG (ref 26.6–33)
MCHC RBC AUTO-ENTMCNC: 32.9 G/DL (ref 31.5–35.7)
MCV RBC AUTO: 94.4 FL (ref 79–97)
PLATELET # BLD AUTO: 292 10*3/MM3 (ref 140–450)
PMV BLD AUTO: 9.4 FL (ref 6–12)
POTASSIUM BLD-SCNC: 3.7 MMOL/L (ref 3.5–5.2)
RBC # BLD AUTO: 4.25 10*6/MM3 (ref 3.77–5.28)
SODIUM BLD-SCNC: 141 MMOL/L (ref 136–145)
WBC NRBC COR # BLD: 6.28 10*3/MM3 (ref 3.4–10.8)

## 2020-02-16 PROCEDURE — 80048 BASIC METABOLIC PNL TOTAL CA: CPT | Performed by: HOSPITALIST

## 2020-02-16 PROCEDURE — 85027 COMPLETE CBC AUTOMATED: CPT | Performed by: HOSPITALIST

## 2020-02-16 RX ORDER — NIFEDIPINE 30 MG/1
30 TABLET, EXTENDED RELEASE ORAL
Status: DISCONTINUED | OUTPATIENT
Start: 2020-02-16 | End: 2020-02-17 | Stop reason: HOSPADM

## 2020-02-16 RX ADMIN — NIFEDIPINE 30 MG: 30 TABLET, FILM COATED, EXTENDED RELEASE ORAL at 11:30

## 2020-02-16 RX ADMIN — LISINOPRIL 20 MG: 20 TABLET ORAL at 08:35

## 2020-02-16 RX ADMIN — BUPROPION HYDROCHLORIDE 300 MG: 300 TABLET, EXTENDED RELEASE ORAL at 08:35

## 2020-02-16 RX ADMIN — GABAPENTIN 300 MG: 300 CAPSULE ORAL at 08:35

## 2020-02-16 RX ADMIN — CLONIDINE HYDROCHLORIDE 0.1 MG: 0.1 TABLET ORAL at 00:14

## 2020-02-16 RX ADMIN — SODIUM CHLORIDE, PRESERVATIVE FREE 10 ML: 5 INJECTION INTRAVENOUS at 22:37

## 2020-02-16 RX ADMIN — ESCITALOPRAM 20 MG: 20 TABLET, FILM COATED ORAL at 08:35

## 2020-02-16 RX ADMIN — SODIUM CHLORIDE, PRESERVATIVE FREE 10 ML: 5 INJECTION INTRAVENOUS at 08:35

## 2020-02-16 RX ADMIN — GABAPENTIN 300 MG: 300 CAPSULE ORAL at 16:55

## 2020-02-16 RX ADMIN — TRAZODONE HYDROCHLORIDE 100 MG: 100 TABLET ORAL at 22:37

## 2020-02-16 RX ADMIN — GABAPENTIN 300 MG: 300 CAPSULE ORAL at 22:38

## 2020-02-16 NOTE — PLAN OF CARE
Problem: Patient Care Overview  Goal: Plan of Care Review  Outcome: Ongoing (interventions implemented as appropriate)  Flowsheets (Taken 2/16/2020 1721)  Progress: improving  Plan of Care Reviewed With: patient   Pt BP has been much better today. It has been as low as 105/60.The additional BP medication has helped out. No need for the clonidine. Pt is NPO at midnight for a renal artery ultrasound in the am. If negative and BP under control discharge tomorrow. No complaints of N/V or any dark stools

## 2020-02-16 NOTE — PROGRESS NOTES
LOS: 3 days     Name: Ronda Steve  Age/Sex: 64 y.o. female  :  1955        PCP: Provider, No Known  Chief Complaint   Patient presents with   • GI Bleeding      Subjective   She is feeling okay denies any new issues or complaints.  Denies headache vision changes or new neurologic symptoms.  No other bleeding overnight.  General: No Fever or Chills, Cardiac: No Chest Pain or Palpitations, Resp: No Cough or SOA, GI: No Nausea, Vomiting, or Diarrhea and Other: No bleeding      buPROPion  mg Oral Daily   escitalopram 20 mg Oral Daily   gabapentin 300 mg Oral TID   lisinopril 20 mg Oral Q24H   NIFEdipine XL 30 mg Oral Q24H   sodium chloride 10 mL Intravenous Q12H   traZODone 100 mg Oral Nightly       sodium chloride 30 mL/hr       Objective   Vital Signs  Temp:  [97.8 °F (36.6 °C)-98.6 °F (37 °C)] 98.6 °F (37 °C)  Heart Rate:  [50-78] 56  Resp:  [18] 18  BP: (100-175)/() 147/98  Body mass index is 28.15 kg/m².    Intake/Output Summary (Last 24 hours) at 2020 1411  Last data filed at 2020 0926  Gross per 24 hour   Intake 960 ml   Output --   Net 960 ml       Physical Exam   Constitutional: She is oriented to person, place, and time. She appears well-developed and well-nourished. No distress.   HENT:   Head: Normocephalic and atraumatic.   Cardiovascular: Normal rate and regular rhythm.   Pulmonary/Chest: Effort normal and breath sounds normal.   Abdominal: Soft. Bowel sounds are normal.   Neurological: She is alert and oriented to person, place, and time.   Skin: Skin is warm and dry. Capillary refill takes less than 2 seconds.   Psychiatric: She has a normal mood and affect. Her behavior is normal.   Nursing note and vitals reviewed.        Results Review:       I reviewed the patient's new clinical results.  Results from last 7 days   Lab Units 20  0440 02/15/20  0707 02/15/20  0012 20  1550 20  0745 20  2344 20  1438   WBC 10*3/mm3 6.28 6.37  --    --   --   --  7.68   HEMOGLOBIN g/dL 13.2 13.7  13.7 12.9 13.0 13.2 12.2 13.0   PLATELETS 10*3/mm3 292 309  --   --   --   --  319     Results from last 7 days   Lab Units 02/16/20  0440 02/15/20  0707 02/13/20  1438   SODIUM mmol/L 141 140 143   POTASSIUM mmol/L 3.7 4.0 3.8   CHLORIDE mmol/L 103 101 105   CO2 mmol/L 26.0 25.3 23.5   BUN mg/dL 17 16 12   CREATININE mg/dL 0.84 0.60 0.68   CALCIUM mg/dL 9.6 9.4 8.7   Estimated Creatinine Clearance: 62.8 mL/min (by C-G formula based on SCr of 0.84 mg/dL).      Assessment/Plan     Gastrointestinal hemorrhage with hematemesis    Hepatitis C    Alcohol intoxication (CMS/HCC)    Alcoholism /alcohol abuse (CMS/HCC)    Elevated BP without diagnosis of hypertension    Depression with anxiety    NSAID long-term use      PLAN  -Hemoglobin remained stable, likely astroid is secondary to alcohol use led to bleeding.  -Endoscopy negative  -Blood pressure remains significantly elevated.  Even despite PRN doses of clonidine which she is received all available doses her blood pressure remains significantly high.  -Has been started on lisinopril and I added nifedipine today.  Would like to get her pressure down to 140/90 before letting her go home.  -Hopefully with additional medications her blood pressure comes down and we can get her out of here tomorrow.      Disposition  Hopefully home tomorrow      Jeevan Mejia MD  [facility]  02/16/20  2:11 PM

## 2020-02-16 NOTE — PLAN OF CARE
Problem: Patient Care Overview  Goal: Plan of Care Review  Outcome: Ongoing (interventions implemented as appropriate)  Flowsheets (Taken 2/16/2020 3288)  Progress: no change  Plan of Care Reviewed With: patient  Outcome Summary: Pt AO in Bradycardia with Variations in Blood Pressure with SBP from 90's to 170's, Pt reported being symptomatic with hypotension including Weakness and Dizzyness

## 2020-02-17 ENCOUNTER — APPOINTMENT (OUTPATIENT)
Dept: CARDIOLOGY | Facility: HOSPITAL | Age: 65
End: 2020-02-17

## 2020-02-17 VITALS
TEMPERATURE: 97.6 F | HEIGHT: 62 IN | HEART RATE: 73 BPM | OXYGEN SATURATION: 96 % | BODY MASS INDEX: 27.99 KG/M2 | RESPIRATION RATE: 16 BRPM | SYSTOLIC BLOOD PRESSURE: 134 MMHG | DIASTOLIC BLOOD PRESSURE: 77 MMHG | WEIGHT: 152.12 LBS

## 2020-02-17 LAB
BH CV ECHO MEAS - DIST REN A EDV LEFT: 26.8 CM/SEC
BH CV ECHO MEAS - DIST REN A PSV LEFT: 81.9 CM/SEC
BH CV ECHO MEAS - DIST REN A RI LEFT: 0.67
BH CV ECHO MEAS - HILAR A EDV LEFT: 13.3 CM/SEC
BH CV ECHO MEAS - HILAR A PSV LEFT: 45.6 CM/SEC
BH CV ECHO MEAS - HILAR A RI LEFT: 0.71
BH CV ECHO MEAS - MID REN A EDV LEFT: 33.4 CM/SEC
BH CV ECHO MEAS - MID REN A PSV LEFT: 116 CM/SEC
BH CV ECHO MEAS - MID REN A RI LEFT: 0.71
BH CV ECHO MEAS - PROX REN A EDV LEFT: 37.6 CM/SEC
BH CV ECHO MEAS - PROX REN A PSV LEFT: 121 CM/SEC
BH CV ECHO MEAS - PROX REN A RI LEFT: 0.69
BH CV VAS BP LEFT ARM: NORMAL MMHG
BH CV VAS BP RIGHT ARM: NORMAL MMHG
BH CV VAS RENAL AORTIC MID EDV: 0 CM/S
BH CV VAS RENAL AORTIC MID PSV: 83.8 CM/S
BH CV VAS RENAL HILUM LEFT EDV: 13.3 CM/S
BH CV VAS RENAL HILUM LEFT PSV: 45.6 CM/S
BH CV VAS RENAL HILUM RIGHT EDV: 13.3 CM/S
BH CV VAS RENAL HILUM RIGHT PSV: 50.2 CM/S
BH CV XLRA MEAS - KID L LEFT: 10.4 CM
BH CV XLRA MEAS - RENAL A ORG RI LEFT: 0.67
BH CV XLRA MEAS DIST REN A EDV RIGHT: 29 CM/SEC
BH CV XLRA MEAS DIST REN A PSV RIGHT: 110 CM/SEC
BH CV XLRA MEAS DIST REN A RI RIGHT: 0.74
BH CV XLRA MEAS HILAR A EDV RIGHT: 13.3 CM/SEC
BH CV XLRA MEAS HILAR A PSV RIGHT: 50.2 CM/SEC
BH CV XLRA MEAS HILAR A RI RIGHT: 0.74
BH CV XLRA MEAS KID L RIGHT: 9.4 CM
BH CV XLRA MEAS KID W RIGHT: 5.5 CM
BH CV XLRA MEAS MID REN A EDV RIGHT: 29.1 CM/SEC
BH CV XLRA MEAS MID REN A PSV RIGHT: 96.3 CM/SEC
BH CV XLRA MEAS MID REN A RI RIGHT: 0.7
BH CV XLRA MEAS PROX REN A EDV RIGHT: 30.6 CM/SEC
BH CV XLRA MEAS PROX REN A PSV RIGHT: 97.4 CM/SEC
BH CV XLRA MEAS PROX REN A RI RIGHT: 0.69
BH CV XLRA MEAS RAR LEFT: 1.44
BH CV XLRA MEAS RAR RIGHT: 1.46
BH CV XLRA MEAS RENAL A ORG EDV LEFT: 39 CM/SEC
BH CV XLRA MEAS RENAL A ORG EDV RIGHT: 40 CM/SEC
BH CV XLRA MEAS RENAL A ORG PSV LEFT: 118 CM/SEC
BH CV XLRA MEAS RENAL A ORG PSV RIGHT: 123 CM/SEC
BH CV XLRA MEAS RENAL A ORG RI RIGHT: 0.67
HCV RNA SERPL NAA+PROBE-ACNC: NORMAL IU/ML
LEFT ACCESSORY RENAL DIST DIAS: 40.9 CM/S
LEFT ACCESSORY RENAL DIST SYS: 119 CM/S
LEFT ACCESSORY RENAL MID DIAS: 42.1 CM/S
LEFT ACCESSORY RENAL MID SYS: 121 CM/S
LEFT ACCESSORY RENAL ORIGIN DIAS: 31 CM/S
LEFT ACCESSORY RENAL ORIGIN SYS: 99.7 CM/S
LEFT ACCESSORY RENAL PROX DIAS: 33.4 CM/S
LEFT ACCESSORY RENAL PROX SYS: 110 CM/S
LEFT KIDNEY WIDTH: 5.1 CM
LEFT RENAL UPPER PARENCHYMA MAX: 18.4 CM/S
LEFT RENAL UPPER PARENCHYMA MIN: 5.28 CM/S
LEFT RENAL UPPER PARENCHYMA RI: 0.71
RIGHT ACCESSORY RENAL DIST DIAS: 21.6 CM/S
RIGHT ACCESSORY RENAL DIST SYS: 73.8 CM/S
RIGHT ACCESSORY RENAL MID DIAS: 16.6 CM/S
RIGHT ACCESSORY RENAL MID SYS: 91.3 CM/S
RIGHT ACCESSORY RENAL ORIGIN DIAS: 24.8 CM/S
RIGHT ACCESSORY RENAL ORIGIN SYS: 78 CM/S
RIGHT ACCESSORY RENAL PROX DIAS: 27.4 CM/S
RIGHT ACCESSORY RENAL PROX SYS: 83.8 CM/S
RIGHT RENAL UPPER PARENCHYMA MAX: 28.2 CM/S
RIGHT RENAL UPPER PARENCHYMA MIN: 9.3 CM/S
RIGHT RENAL UPPER PARENCHYMA RI: 0.67
TEST INFORMATION: NORMAL

## 2020-02-17 PROCEDURE — 93975 VASCULAR STUDY: CPT

## 2020-02-17 RX ORDER — FAMOTIDINE 20 MG/1
20 TABLET, FILM COATED ORAL DAILY
Qty: 30 TABLET | Refills: 0 | Status: SHIPPED | OUTPATIENT
Start: 2020-02-17 | End: 2020-03-19 | Stop reason: SDUPTHER

## 2020-02-17 RX ORDER — ACETAMINOPHEN 325 MG/1
650 TABLET ORAL 3 TIMES DAILY PRN
Start: 2020-02-17

## 2020-02-17 RX ORDER — NIFEDIPINE 30 MG/1
30 TABLET, FILM COATED, EXTENDED RELEASE ORAL
Qty: 30 TABLET | Refills: 0 | Status: SHIPPED | OUTPATIENT
Start: 2020-02-18 | End: 2020-03-19 | Stop reason: SDUPTHER

## 2020-02-17 RX ORDER — LISINOPRIL 10 MG/1
10 TABLET ORAL
Qty: 30 TABLET | Refills: 0 | Status: SHIPPED | OUTPATIENT
Start: 2020-02-18 | End: 2020-03-19 | Stop reason: SDUPTHER

## 2020-02-17 RX ADMIN — LISINOPRIL 20 MG: 20 TABLET ORAL at 09:20

## 2020-02-17 RX ADMIN — SODIUM CHLORIDE, PRESERVATIVE FREE 10 ML: 5 INJECTION INTRAVENOUS at 09:20

## 2020-02-17 RX ADMIN — GABAPENTIN 300 MG: 300 CAPSULE ORAL at 09:20

## 2020-02-17 RX ADMIN — ESCITALOPRAM 20 MG: 20 TABLET, FILM COATED ORAL at 09:20

## 2020-02-17 RX ADMIN — NIFEDIPINE 30 MG: 30 TABLET, FILM COATED, EXTENDED RELEASE ORAL at 09:20

## 2020-02-17 RX ADMIN — BUPROPION HYDROCHLORIDE 300 MG: 300 TABLET, EXTENDED RELEASE ORAL at 09:20

## 2020-02-17 NOTE — DISCHARGE SUMMARY
NAME: Ronda Steve ADMIT: 2020   : 1955  PCP: Provider, No Known    MRN: [id number] LOS: 4 days   AGE/SEX: 64 y.o. female  ROOM: [facility]/     Date of Admission:  2020  Date of Discharge:  2020    PCP: Provider, No Known    CHIEF COMPLAINT  GI Bleeding      DISCHARGE DIAGNOSIS  Active Hospital Problems    Diagnosis  POA   • **Gastrointestinal hemorrhage with hematemesis [K92.0]  Yes   • Elevated BP without diagnosis of hypertension [R03.0]  Yes   • Depression with anxiety [F41.8]  Yes   • NSAID long-term use [Z79.1]  Not Applicable   • Hepatitis C [B19.20]  Yes   • Alcohol intoxication (CMS/HCC) [F10.929]  Yes   • Alcoholism /alcohol abuse (CMS/HCC) [F10.20]  Yes      Resolved Hospital Problems   No resolved problems to display.       SECONDARY DIAGNOSES  Past Medical History:   Diagnosis Date   • Alcoholic hepatitis    • Disease of thyroid gland    • Hepatitis C    • Hyperlipidemia        CONSULTS     GI    HOSPITAL COURSE  Patient is a 64 y.o. female  who drinks alcohol chronically and presented to the hospital with nausea vomiting hematemesis. She also had been taking a lot of nsaids.  EGD was negative for any bleeding but was found to have some gastritis.  Hemoglobin is remained stable.  Her blood pressures been significantly elevated.  Is been as high as 200 systolic.  She was started on lisinopril and nifedipine and BP better, will actually decrease the lisinopril to a lower dose. She was stable and ready for discharge today and will get a PCP to get established with as an outpatient.     DIAGNOSTICS    Duplex Renal Artery - Bilateral Complete CAR [[id number]] Kasi as Reviewed   Order Status: Completed Collected: 20 0748    Updated: 20 0914    Kid L 10.4 cm     KID L RIGHT 9.4 cm     Left kidney width 5.1 cm     KID W RIGHT 5.5 cm     RENAL A ORG PSV LEFT 118.0 cm/sec     RENAL A ORG PSV RIGHT 123.0 cm/sec     RENAL A ORG EDV LEFT 39.0 cm/sec     RENAL A ORG EDV  RIGHT 40.0 cm/sec     RENAL A ORG RI LEFT 0.67    RENAL A ORG RI RIGHT 0.67    PROX AYUSH A PSV LEFT 121.0 cm/sec     PROX AYUSH A PSV RIGHT 97.4 cm/sec     PROX AYUSH A EDV LEFT 37.6 cm/sec     PROX AYUSH A EDV RIGHT 30.6 cm/sec     PROX AYUSH A RI LEFT 0.69    PROX AYUSH A RI RIGHT 0.69    MID AYUSH A PSV LEFT 116.0 cm/sec     MID AYUSH A PSV RIGHT 96.3 cm/sec     MID AYUSH A EDV LEFT 33.4 cm/sec     MID AYUSH A EDV RIGHT 29.1 cm/sec     MID AYUSH A RI LEFT 0.71    MID AYUSH A RI RIGHT 0.7    DIST AYUSH A PSV LEFT 81.9 cm/sec     DIST AYUSH A PSV RIGHT 110.0 cm/sec     DIST AYUSH A EDV LEFT 26.8 cm/sec     DIST AYUSH A EDV RIGHT 29.0 cm/sec     DIST AYUSH A RI LEFT 0.67    DIST AYUSH A RI RIGHT 0.74    HILAR A PSV LEFT 45.6 cm/sec     HILAR A PSV RIGHT 50.2 cm/sec     HILAR A EDV LEFT 13.3 cm/sec     HILAR A EDV RIGHT 13.3 cm/sec     HILAR A RI LEFT 0.71    HILAR A RI RIGHT 0.74    BH CV VAS BP RIGHT /78 mmHg     BH CV VAS BP LEFT /79 mmHg     RAR RIGHT 1.46    Right renal upper parenchyma max 28.2 cm/s     Right renal upper parenchyma min 9.3 cm/s     Right accessory renal origin sys 78 cm/s     Right accessory renal prox sys 83.8 cm/s     Right accessory renal mid sys 91.3 cm/s     Right accessory renal dist sys 73.8 cm/s     Right accessory renal origin anderson 24.8 cm/s     Right accessory renal prox anderson 27.4 cm/s     Right accessory renal mid anderson 16.6 cm/s     Right accessory renal dist anderson 21.6 cm/s     Left renal upper parenchyma max 18.4 cm/s     Left renal upper parenchyma min 5.28 cm/s     Left accessory renal origin sys 99.7 cm/s     Left accessory renal prox sys 110 cm/s     Left accessory renal mid sys 121 cm/s     Left accessory renal dist sys 119 cm/s     Left accessory renal origin anderson 31 cm/s     Left accessory renal prox anderson 33.4 cm/s     Left accessory renal mid anderson 42.1 cm/s     Left accessory renal dist anderson 40.9 cm/s     Aortic Mid PSV 83.8 cm/s     Aortic Mid EDV 0 cm/s     Hilum Right PSV 50.2 cm/s     Hilum  Right EDV 13.3 cm/s     Hilum Left PSV 45.6 cm/s     Hilum Left EDV 13.3 cm/s     RAR LEFT 1.44    Right renal upper parenchyma RI 0.67    Left renal upper parenchyma RI 0.71   Narrative:     · Normal right renal artery.  · Normal left renal artery.      US Renal Bilateral [[id number]] Kasi as Reviewed   Order Status: Completed Collected: 02/15/20 1429    Updated: 02/15/20 1434   Narrative:     US RENAL BILATERAL-     INDICATIONS: Hypertension     TECHNIQUE: ULTRASOUND OF THE KIDNEYS AND URINARY BLADDER.     COMPARISON: CT from 04/29/2014     FINDINGS:     The right kidney measures 9.6 centimeters, the left kidney measures 10.6  centimeters.     No renal lesion is identified. No hydronephrosis or echogenic  nephrolithiasis.     The urinary bladder appears unremarkable. Bilateral ureteral jets were  observed.      Impression:     No hydronephrosis or echogenic nephrolithiasis.     Note: This exam does not excluded renal artery stenosis as a possible  cause of hypertension. If there is clinical suspicion for renal artery  stenosis, further evaluation could include angiographic imaging or  nuclear captopril exam.        This report was finalized on 2/15/2020 2:31 PM by Dr. Vimal Stallings M.D.      US Liver [[id number]] Kasi as Reviewed   Order Status: Completed Collected: 02/14/20 1527    Updated: 02/14/20 1533   Narrative:     US LIVER-     INDICATIONS: Hepatitis C     TECHNIQUE: Ultrasound of the right upper quadrant     COMPARISON: CT from 04/29/2014     FINDINGS:     Assessment was limited by bowel gas and difficulty with breath-holding.     The gallbladder is nontender, with no stones demonstrated. No  gallbladder wall thickening or pericholecystic fluid.     No intrahepatic biliary ductal dilatation is demonstrated. The common  biliary duct caliber is measured at 6.8 mm.     Diffusely, the echogenicity of the liver is increased, suggesting  steatosis. No discrete liver lesion is identified, limited by  difficulty  with penetration and rib shadowing; if indicated, enhanced liver imaging  could be obtained for further evaluation.     The pancreas is partly obscured. The right kidney, 10.0 cm, and the  pancreas otherwise appear unremarkable.               Impression:        No discrete liver lesion identified, limited as described. Hepatic  steatosis. Follow-up/further evaluation can be obtained as indicated.                Collected Updated Procedure Result Status    02/16/2020 0440 02/16/2020 0549 Basic Metabolic Panel [[id number]]    (Abnormal)   Blood    Final result Glucose 117High  mg/dL   BUN 17 mg/dL   Creatinine 0.84 mg/dL   Sodium 141 mmol/L   Potassium 3.7 mmol/L   Chloride 103 mmol/L   CO2 26.0 mmol/L   Calcium 9.6 mg/dL   eGFR Non African Am 68 mL/min/1.73   BUN/Creatinine Ratio 20.2    Anion Gap 12.0 mmol/L           02/16/2020 0440 02/16/2020 0519 CBC (No Diff) [[id number]]   Blood    Final result WBC 6.28 10*3/mm3   RBC 4.25 10*6/mm3   Hemoglobin 13.2 g/dL   Hematocrit 40.1 %   MCV 94.4 fL   MCH 31.1 pg   MCHC 32.9 g/dL   RDW 14.4 %   RDW-SD 48.8 fl   MPV 9.4 fL   Platelets 292 10*3/mm3          PHYSICAL EXAM  Objective    Alert  nad  No resp distress  Soft, NT    CONDITION ON DISCHARGE  Stable.      DISCHARGE DISPOSITION   Home or Self Care      DISCHARGE MEDICATIONS       Your medication list      START taking these medications      Instructions Last Dose Given Next Dose Due   acetaminophen 325 MG tablet  Commonly known as:  TYLENOL      Take 2 tablets by mouth 3 (Three) Times a Day As Needed for Mild Pain .       famotidine 20 MG tablet  Commonly known as:  PEPCID      Take 1 tablet by mouth Daily.       lisinopril 10 MG tablet  Commonly known as:  PRINIVIL,ZESTRIL  Start taking on:  February 18, 2020      Take 1 tablet by mouth Daily.       NIFEdipine CC 30 MG 24 hr tablet  Commonly known as:  ADALAT CC  Start taking on:  February 18, 2020      Take 1 tablet by mouth Daily.          CONTINUE taking  these medications      Instructions Last Dose Given Next Dose Due   buPROPion  MG 24 hr tablet  Commonly known as:  WELLBUTRIN XL      Take 300 mg by mouth Daily.       escitalopram 20 MG tablet  Commonly known as:  LEXAPRO      Take 20 mg by mouth Daily.       gabapentin 300 MG capsule  Commonly known as:  NEURONTIN      Take 300 mg by mouth 3 (Three) Times a Day.       traZODone 100 MG tablet  Commonly known as:  DESYREL      Take 100 mg by mouth Every Night.          STOP taking these medications    naproxen sodium 220 MG tablet  Commonly known as:  ALEVE              Where to Get Your Medications      These medications were sent to [id number] - [place], [place] - [address] AT [facility] - [phone]  - [phone] [address] [address]    Phone:  [phone]   · famotidine 20 MG tablet  · lisinopril 10 MG tablet  · NIFEdipine CC 30 MG 24 hr tablet     Information about where to get these medications is not yet available    Ask your nurse or doctor about these medications  · acetaminophen 325 MG tablet        No future appointments.  Follow-up Information     Provider, No Known. Schedule an appointment as soon as possible for a visit.    Why:  1-2 weeks  Contact information:  [facility] [id number] [phone]             Jordan Iqbal MD Follow up.    Specialty:  Gastroenterology  Why:  info if you need to get in contact with GI  Contact information:  [address] [facility] [address]  [facility] [id number] [phone]                   TEST  RESULTS PENDING AT DISCHARGE         Jasiel Willingham MD  [facility]  02/17/20  11:31 AM      Time: greater than 32 minutes on discharge  It was a pleasure taking care of this patient while in the hospital.

## 2020-02-17 NOTE — PLAN OF CARE
Problem: Patient Care Overview  Goal: Plan of Care Review  Outcome: Ongoing (interventions implemented as appropriate)  Flowsheets (Taken 2/17/2020 0542)  Progress: improving  Plan of Care Reviewed With: patient  Outcome Summary: Pt has been npo past mn for renal U/S this am, no distress noticed, vss, I will continue to monitor.  Goal: Individualization and Mutuality  Outcome: Ongoing (interventions implemented as appropriate)  Goal: Discharge Needs Assessment  Outcome: Ongoing (interventions implemented as appropriate)  Goal: Interprofessional Rounds/Family Conf  Outcome: Ongoing (interventions implemented as appropriate)     Problem: Pain, Chronic (Adult)  Goal: Identify Related Risk Factors and Signs and Symptoms  Outcome: Ongoing (interventions implemented as appropriate)  Goal: Acceptable Pain/Comfort Level and Functional Ability  Outcome: Ongoing (interventions implemented as appropriate)     Problem: Gastrointestinal Bleeding (Adult)  Goal: Signs and Symptoms of Listed Potential Problems Will be Absent, Minimized or Managed (Gastrointestinal Bleeding)  Outcome: Ongoing (interventions implemented as appropriate)     Problem: Fall Risk (Adult)  Goal: Identify Related Risk Factors and Signs and Symptoms  Outcome: Ongoing (interventions implemented as appropriate)  Goal: Absence of Fall  Outcome: Ongoing (interventions implemented as appropriate)

## 2020-02-18 ENCOUNTER — READMISSION MANAGEMENT (OUTPATIENT)
Dept: CALL CENTER | Facility: HOSPITAL | Age: 65
End: 2020-02-18

## 2020-02-18 NOTE — OUTREACH NOTE
Prep Survey      Responses   Facility patient discharged from?  [facility]   Is patient eligible?  Yes   Discharge diagnosis  GI Hemorrhage with hematemesis,s/p EGD,  Elevated BP w/o diagnosis of HTN, depression with anxiety, NSAID long term use, Hepatitis, alcohol intoxication, alcoholism/alcohol abuse   Does the patient have one of the following disease processes/diagnoses(primary or secondary)?  Other   Does the patient have Home health ordered?  No   Is there a DME ordered?  No   Comments regarding appointments  Pt to schedule F/U with PCP   Prep survey completed?  Yes          Meri Seymour RN

## 2020-02-21 ENCOUNTER — READMISSION MANAGEMENT (OUTPATIENT)
Dept: CALL CENTER | Facility: HOSPITAL | Age: 65
End: 2020-02-21

## 2020-02-21 NOTE — OUTREACH NOTE
Medical Week 1 Survey      Responses   Facility patient discharged from?  [facility]   Does the patient have one of the following disease processes/diagnoses(primary or secondary)?  Other   Is there a successful TCM telephone encounter documented?  No   Week 1 attempt successful?  Yes   Call start time  1119   Revoke  Decline to participate   Call end time  1119          Keyla Blevins RN

## 2020-03-19 ENCOUNTER — OFFICE VISIT (OUTPATIENT)
Dept: INTERNAL MEDICINE | Facility: CLINIC | Age: 65
End: 2020-03-19

## 2020-03-19 VITALS
TEMPERATURE: 98.5 F | WEIGHT: 139 LBS | DIASTOLIC BLOOD PRESSURE: 90 MMHG | OXYGEN SATURATION: 97 % | BODY MASS INDEX: 24.63 KG/M2 | HEIGHT: 63 IN | RESPIRATION RATE: 16 BRPM | SYSTOLIC BLOOD PRESSURE: 138 MMHG | HEART RATE: 70 BPM

## 2020-03-19 DIAGNOSIS — Z00.00 ANNUAL PHYSICAL EXAM: ICD-10-CM

## 2020-03-19 DIAGNOSIS — Z72.0 TOBACCO USE: ICD-10-CM

## 2020-03-19 DIAGNOSIS — IMO0001 ALCOHOLISM /ALCOHOL ABUSE: ICD-10-CM

## 2020-03-19 DIAGNOSIS — F41.8 DEPRESSION WITH ANXIETY: Chronic | ICD-10-CM

## 2020-03-19 DIAGNOSIS — Z23 NEED FOR TETANUS, DIPHTHERIA, AND ACELLULAR PERTUSSIS (TDAP) VACCINE IN PATIENT OF ADOLESCENT AGE OR OLDER: ICD-10-CM

## 2020-03-19 DIAGNOSIS — I10 ESSENTIAL HYPERTENSION: ICD-10-CM

## 2020-03-19 DIAGNOSIS — Z12.31 SCREENING MAMMOGRAM, ENCOUNTER FOR: Primary | ICD-10-CM

## 2020-03-19 DIAGNOSIS — Z23 NEED FOR PNEUMOCOCCAL VACCINE: ICD-10-CM

## 2020-03-19 PROCEDURE — 90732 PPSV23 VACC 2 YRS+ SUBQ/IM: CPT | Performed by: NURSE PRACTITIONER

## 2020-03-19 PROCEDURE — 99214 OFFICE O/P EST MOD 30 MIN: CPT | Performed by: NURSE PRACTITIONER

## 2020-03-19 PROCEDURE — 90471 IMMUNIZATION ADMIN: CPT | Performed by: NURSE PRACTITIONER

## 2020-03-19 PROCEDURE — 90472 IMMUNIZATION ADMIN EACH ADD: CPT | Performed by: NURSE PRACTITIONER

## 2020-03-19 PROCEDURE — 90715 TDAP VACCINE 7 YRS/> IM: CPT | Performed by: NURSE PRACTITIONER

## 2020-03-19 PROCEDURE — 99386 PREV VISIT NEW AGE 40-64: CPT | Performed by: NURSE PRACTITIONER

## 2020-03-19 RX ORDER — NIFEDIPINE 30 MG/1
30 TABLET, FILM COATED, EXTENDED RELEASE ORAL
Qty: 90 TABLET | Refills: 1 | Status: SHIPPED | OUTPATIENT
Start: 2020-03-19 | End: 2020-11-10

## 2020-03-19 RX ORDER — FAMOTIDINE 20 MG/1
20 TABLET, FILM COATED ORAL DAILY
Qty: 30 TABLET | Refills: 5 | Status: SHIPPED | OUTPATIENT
Start: 2020-03-19 | End: 2020-08-14

## 2020-03-19 RX ORDER — LISINOPRIL 10 MG/1
10 TABLET ORAL
Qty: 90 TABLET | Refills: 1 | Status: SHIPPED | OUTPATIENT
Start: 2020-03-19 | End: 2020-09-14

## 2020-03-19 NOTE — PROGRESS NOTES
Kiersten Steve is a 65 y.o. female. Patient is here today for   Chief Complaint   Patient presents with   • Annual Exam   • Establish Care          Vitals:    03/19/20 1107   BP: 138/90   Pulse: 70   Resp: 16   Temp: 98.5 °F (36.9 °C)   SpO2: 97%     Body mass index is 25.02 kg/m².    The following portions of the patient's history were reviewed and updated as appropriate: allergies, current medications, past family history, past medical history, past social history, past surgical history and problem list.    Past Medical History:   Diagnosis Date   • Alcoholic hepatitis    • Disease of thyroid gland    • Hepatitis C    • Hyperlipidemia       No Known Allergies   Social History     Socioeconomic History   • Marital status: Single     Spouse name: Not on file   • Number of children: Not on file   • Years of education: Not on file   • Highest education level: Not on file   Tobacco Use   • Smoking status: Current Every Day Smoker     Packs/day: 0.50     Years: 48.00     Pack years: 24.00   • Smokeless tobacco: Never Used   Substance and Sexual Activity   • Alcohol use: Yes     Alcohol/week: 36.0 standard drinks     Types: 35 Cans of beer, 1 Shots of liquor per week   • Drug use: Yes     Frequency: 2.0 times per week     Types: Marijuana   • Sexual activity: Defer        Current Outpatient Medications:   •  acetaminophen (TYLENOL) 325 MG tablet, Take 2 tablets by mouth 3 (Three) Times a Day As Needed for Mild Pain ., Disp: , Rfl:   •  buPROPion XL (WELLBUTRIN XL) 150 MG 24 hr tablet, Take 300 mg by mouth Daily., Disp: , Rfl:   •  escitalopram (LEXAPRO) 20 MG tablet, Take 20 mg by mouth Daily., Disp: , Rfl:   •  famotidine (Pepcid) 20 MG tablet, Take 1 tablet by mouth Daily., Disp: 30 tablet, Rfl: 5  •  gabapentin (NEURONTIN) 300 MG capsule, Take 300 mg by mouth 3 (Three) Times a Day., Disp: , Rfl:   •  lisinopril (PRINIVIL,ZESTRIL) 10 MG tablet, Take 1 tablet by mouth Daily., Disp: 90 tablet, Rfl:  1  •  NIFEdipine CC (ADALAT CC) 30 MG 24 hr tablet, Take 1 tablet by mouth Daily., Disp: 90 tablet, Rfl: 1  •  traZODone (DESYREL) 100 MG tablet, Take 100 mg by mouth Every Night., Disp: , Rfl:   No current facility-administered medications for this visit.        Objective   History of Present Illness   Ronda Steve 65 y.o. female who presents for an Annual Wellness Visit and to establish care. She has not had a PCP in several years.   She is  she has a history of   Patient Active Problem List   Diagnosis   • Hepatitis C   • Alcohol intoxication (CMS/HCC)   • Alcoholism /alcohol abuse (CMS/HCC)   • Gastrointestinal hemorrhage with hematemesis   • Elevated BP without diagnosis of hypertension   • Depression with anxiety   • NSAID long-term use   .      Plan to update vaccines if needed today.    Health Habits:  Dental Exam. not up to date - does not go   Eye Exam. up to date  Exercise: 5 times/week.  Current exercise activities include: walking   drinks 4 beers a day- has cut back , but does not want to quit or go to AA  Denies risk for STD   Smokes 1/2-1ppd cigarettes  H/o drug abuse heroin in past, only elicit drug use now is mariajuana.     She was admitted to [facility]  With GI hemorrhage, HTN, and ETOH intoxication. She had an EGD. She was started on nifedipine and lisinopril for HTN. she does not monitor her blood pressure at home. She was also started on famotidine. She feels well and has no complaints today.   She is followed by psychiatry for anxiety and depression   She is fasting today for labs   Current outpatient and discharge medications have been reconciled for the patient.  Reviewed by: PRICE Pond    PHQ-9 Depression Screening  Little interest or pleasure in doing things? 3   Feeling down, depressed, or hopeless? 3   Trouble falling or staying asleep, or sleeping too much? 0   Feeling tired or having little energy? 3   Poor appetite or overeating? 0   Feeling bad about yourself - or that  you are a failure or have let yourself or your family down? 3   Trouble concentrating on things, such as reading the newspaper or watching television? 0   Moving or speaking so slowly that other people could have noticed? Or the opposite - being so fidgety or restless that you have been moving around a lot more than usual? 0   Thoughts that you would be better off dead, or of hurting yourself in some way? 0   PHQ-9 Total Score 12   If you checked off any problems, how difficult have these problems made it for you to do your work, take care of things at home, or get along with other people? Somewhat difficult       Lab Results (most recent)     None            Review of Systems   Constitutional: Negative for fatigue.   HENT: Negative for congestion.    Respiratory: Negative.    Cardiovascular: Negative for chest pain, palpitations and leg swelling.   Gastrointestinal: Negative for abdominal distention, abdominal pain, anal bleeding, blood in stool, constipation, diarrhea, nausea, rectal pain and vomiting.   Genitourinary: Negative for difficulty urinating, pelvic pain and vaginal discharge.   Musculoskeletal: Positive for arthralgias (chronic left leg pain and ankle pain ).   Allergic/Immunologic: Positive for environmental allergies.   Neurological: Negative for dizziness and headaches.   Psychiatric/Behavioral: Positive for dysphoric mood and sleep disturbance. The patient is nervous/anxious.        Physical Exam   Constitutional: She is oriented to person, place, and time. Vital signs are normal. She appears well-developed and well-nourished. No distress.   HENT:   Head: Normocephalic.   Right Ear: Ear canal normal. A middle ear effusion is present.   Left Ear: Tympanic membrane and ear canal normal.   Nose: Rhinorrhea present.   Mouth/Throat: Uvula is midline and oropharynx is clear and moist.   Eyes: Lids are normal. Lids are everted and swept, no foreign bodies found.   Neck: No thyromegaly present.    Cardiovascular: Normal rate, regular rhythm and normal heart sounds.   BP recheck 142/80   Pulmonary/Chest: Effort normal and breath sounds normal.   Abdominal: Soft. Normal appearance and bowel sounds are normal. There is no tenderness.   Musculoskeletal:        Right lower leg: She exhibits no edema.        Left lower leg: She exhibits no edema.   Neurological: She is alert and oriented to person, place, and time.   Skin: Skin is warm, dry and intact.   Psychiatric: She has a normal mood and affect.       ASSESSMENT       Problem List Items Addressed This Visit     Depression with anxiety (Chronic)    Alcoholism /alcohol abuse (CMS/HCC)      Other Visit Diagnoses     Screening mammogram, encounter for    -  Primary    Relevant Orders    Mammo Screening Bilateral With CAD    Annual physical exam        Relevant Orders    Lipid Panel With LDL / HDL Ratio    CBC & Differential    Comprehensive Metabolic Panel    Urinalysis With Microscopic - Urine, Clean Catch    TSH Rfx On Abnormal To Free T4    Need for tetanus, diphtheria, and acellular pertussis (Tdap) vaccine in patient of adolescent age or older        Relevant Orders    Tdap Vaccine Greater Than or Equal To 6yo IM (Completed)    Tobacco use        Essential hypertension        Relevant Medications    lisinopril (PRINIVIL,ZESTRIL) 10 MG tablet    NIFEdipine CC (ADALAT CC) 30 MG 24 hr tablet    Need for pneumococcal vaccine        Relevant Orders    Pneumococcal Polysaccharide Vaccine 23-Valent Greater Than or Equal To 3yo Subcutaneous / IM (Completed)          PLAN    Will check labs and call her with the results   Discussed smoking cessation- she is not ready to quit  Discussed ETOH cessation and AA- she declined  Follow up with psych for anxiety and depression- advised patient I will not prescribe her xanax, JOCELYNN reviewed from February   Will continue nifedipine and lisinopril for HTN  tdap and pneumovax today, recommend shingrix at pharmacy  [facility]  Discussed the importance of regular dental screenings   Recommend dermatology referral for dark spot in her lip- d/w patient it could be a angiokeratoma but with her smoking history should get it checked  Recommend calling her ortho for chronic ankle pain    Return in about 6 months (around 9/19/2020) for with labs.

## 2020-03-20 ENCOUNTER — TELEPHONE (OUTPATIENT)
Dept: INTERNAL MEDICINE | Facility: CLINIC | Age: 65
End: 2020-03-20

## 2020-03-20 LAB
ALBUMIN SERPL-MCNC: 4.2 G/DL (ref 3.5–5.2)
ALBUMIN/GLOB SERPL: 1.8 G/DL
ALP SERPL-CCNC: 130 U/L (ref 39–117)
ALT SERPL-CCNC: 16 U/L (ref 1–33)
APPEARANCE UR: CLEAR
AST SERPL-CCNC: 18 U/L (ref 1–32)
BACTERIA #/AREA URNS HPF: ABNORMAL /HPF
BASOPHILS # BLD AUTO: 0.04 10*3/MM3 (ref 0–0.2)
BASOPHILS NFR BLD AUTO: 0.5 % (ref 0–1.5)
BILIRUB SERPL-MCNC: 0.2 MG/DL (ref 0.2–1.2)
BILIRUB UR QL STRIP: NEGATIVE
BUN SERPL-MCNC: 13 MG/DL (ref 8–23)
BUN/CREAT SERPL: 15.3 (ref 7–25)
CALCIUM SERPL-MCNC: 9.7 MG/DL (ref 8.6–10.5)
CASTS URNS MICRO: ABNORMAL
CHLORIDE SERPL-SCNC: 102 MMOL/L (ref 98–107)
CHOLEST SERPL-MCNC: 224 MG/DL (ref 0–200)
CO2 SERPL-SCNC: 26.3 MMOL/L (ref 22–29)
COLOR UR: ABNORMAL
CREAT SERPL-MCNC: 0.85 MG/DL (ref 0.57–1)
EOSINOPHIL # BLD AUTO: 0.24 10*3/MM3 (ref 0–0.4)
EOSINOPHIL NFR BLD AUTO: 3.2 % (ref 0.3–6.2)
EPI CELLS #/AREA URNS HPF: ABNORMAL /HPF
ERYTHROCYTE [DISTWIDTH] IN BLOOD BY AUTOMATED COUNT: 14.3 % (ref 12.3–15.4)
GLOBULIN SER CALC-MCNC: 2.4 GM/DL
GLUCOSE SERPL-MCNC: 84 MG/DL (ref 65–99)
GLUCOSE UR QL: NEGATIVE
HCT VFR BLD AUTO: 41.6 % (ref 34–46.6)
HDLC SERPL-MCNC: 55 MG/DL (ref 40–60)
HGB BLD-MCNC: 13.9 G/DL (ref 12–15.9)
HGB UR QL STRIP: NEGATIVE
IMM GRANULOCYTES # BLD AUTO: 0.02 10*3/MM3 (ref 0–0.05)
IMM GRANULOCYTES NFR BLD AUTO: 0.3 % (ref 0–0.5)
KETONES UR QL STRIP: ABNORMAL
LDLC SERPL CALC-MCNC: 138 MG/DL (ref 0–100)
LDLC/HDLC SERPL: 2.51 {RATIO}
LEUKOCYTE ESTERASE UR QL STRIP: NEGATIVE
LYMPHOCYTES # BLD AUTO: 2.04 10*3/MM3 (ref 0.7–3.1)
LYMPHOCYTES NFR BLD AUTO: 26.8 % (ref 19.6–45.3)
MCH RBC QN AUTO: 31.5 PG (ref 26.6–33)
MCHC RBC AUTO-ENTMCNC: 33.4 G/DL (ref 31.5–35.7)
MCV RBC AUTO: 94.3 FL (ref 79–97)
MONOCYTES # BLD AUTO: 0.79 10*3/MM3 (ref 0.1–0.9)
MONOCYTES NFR BLD AUTO: 10.4 % (ref 5–12)
NEUTROPHILS # BLD AUTO: 4.48 10*3/MM3 (ref 1.7–7)
NEUTROPHILS NFR BLD AUTO: 58.8 % (ref 42.7–76)
NITRITE UR QL STRIP: NEGATIVE
NRBC BLD AUTO-RTO: 0.1 /100 WBC (ref 0–0.2)
PH UR STRIP: 6 [PH] (ref 5–8)
PLATELET # BLD AUTO: 363 10*3/MM3 (ref 140–450)
POTASSIUM SERPL-SCNC: 4.6 MMOL/L (ref 3.5–5.2)
PROT SERPL-MCNC: 6.6 G/DL (ref 6–8.5)
PROT UR QL STRIP: ABNORMAL
RBC # BLD AUTO: 4.41 10*6/MM3 (ref 3.77–5.28)
RBC #/AREA URNS HPF: ABNORMAL /HPF
SODIUM SERPL-SCNC: 141 MMOL/L (ref 136–145)
SP GR UR: 1.03 (ref 1–1.03)
TRIGL SERPL-MCNC: 154 MG/DL (ref 0–150)
TSH SERPL DL<=0.005 MIU/L-ACNC: 2.63 UIU/ML (ref 0.27–4.2)
UROBILINOGEN UR STRIP-MCNC: ABNORMAL MG/DL
VLDLC SERPL CALC-MCNC: 30.8 MG/DL
WBC # BLD AUTO: 7.61 10*3/MM3 (ref 3.4–10.8)
WBC #/AREA URNS HPF: ABNORMAL /HPF

## 2020-03-20 NOTE — TELEPHONE ENCOUNTER
----- Message from PRICE Pond sent at 3/20/2020 10:58 AM EDT -----  Please call her and let her know hat her TC was elevated at 224, trigs were 154, and ldl is elevated at 138, recommend heart healthy diet, decreasing fast foods and will recheck at her follow up in 6 months   Otherwise her labs look good

## 2020-08-14 RX ORDER — FAMOTIDINE 20 MG/1
20 TABLET, FILM COATED ORAL DAILY
Qty: 30 TABLET | Refills: 5 | Status: SHIPPED | OUTPATIENT
Start: 2020-08-14 | End: 2020-12-02 | Stop reason: HOSPADM

## 2020-09-11 DIAGNOSIS — E78.00 ELEVATED CHOLESTEROL: Primary | ICD-10-CM

## 2020-09-14 RX ORDER — LISINOPRIL 10 MG/1
10 TABLET ORAL
Qty: 90 TABLET | Refills: 1 | Status: SHIPPED | OUTPATIENT
Start: 2020-09-14 | End: 2021-05-28

## 2020-11-10 RX ORDER — NIFEDIPINE 30 MG/1
30 TABLET, FILM COATED, EXTENDED RELEASE ORAL
Qty: 90 TABLET | Refills: 1 | Status: SHIPPED | OUTPATIENT
Start: 2020-11-10

## 2020-12-01 ENCOUNTER — ANESTHESIA EVENT (OUTPATIENT)
Dept: GASTROENTEROLOGY | Facility: HOSPITAL | Age: 65
End: 2020-12-01

## 2020-12-01 ENCOUNTER — HOSPITAL ENCOUNTER (OUTPATIENT)
Facility: HOSPITAL | Age: 65
Discharge: HOME | End: 2020-12-02
Attending: EMERGENCY MEDICINE | Admitting: INTERNAL MEDICINE

## 2020-12-01 ENCOUNTER — ANESTHESIA (OUTPATIENT)
Dept: GASTROENTEROLOGY | Facility: HOSPITAL | Age: 65
End: 2020-12-01

## 2020-12-01 ENCOUNTER — APPOINTMENT (OUTPATIENT)
Dept: CT IMAGING | Facility: HOSPITAL | Age: 65
End: 2020-12-01

## 2020-12-01 DIAGNOSIS — IMO0001 ALCOHOLISM /ALCOHOL ABUSE: ICD-10-CM

## 2020-12-01 DIAGNOSIS — N83.201 RIGHT OVARIAN CYST: ICD-10-CM

## 2020-12-01 DIAGNOSIS — K76.0 FATTY LIVER: ICD-10-CM

## 2020-12-01 DIAGNOSIS — K92.0 HEMATEMESIS WITH NAUSEA: ICD-10-CM

## 2020-12-01 DIAGNOSIS — K29.21 ACUTE ALCOHOLIC GASTRITIS WITH HEMORRHAGE: Primary | ICD-10-CM

## 2020-12-01 PROBLEM — K27.9 PUD (PEPTIC ULCER DISEASE): Status: ACTIVE | Noted: 2020-12-01

## 2020-12-01 PROBLEM — I10 ESSENTIAL HYPERTENSION: Status: ACTIVE | Noted: 2020-12-01

## 2020-12-01 LAB
ABO GROUP BLD: NORMAL
ALBUMIN SERPL-MCNC: 4.8 G/DL (ref 3.5–5.2)
ALBUMIN/GLOB SERPL: 1.7 G/DL
ALP SERPL-CCNC: 157 U/L (ref 39–117)
ALT SERPL W P-5'-P-CCNC: 39 U/L (ref 1–33)
ANION GAP SERPL CALCULATED.3IONS-SCNC: 20 MMOL/L (ref 5–15)
APTT PPP: 28.5 SECONDS (ref 22.7–35.4)
AST SERPL-CCNC: 36 U/L (ref 1–32)
B PARAPERT DNA SPEC QL NAA+PROBE: NOT DETECTED
B PERT DNA SPEC QL NAA+PROBE: NOT DETECTED
BACTERIA UR QL AUTO: ABNORMAL /HPF
BASOPHILS # BLD AUTO: 0.03 10*3/MM3 (ref 0–0.2)
BASOPHILS NFR BLD AUTO: 0.2 % (ref 0–1.5)
BILIRUB SERPL-MCNC: 0.3 MG/DL (ref 0–1.2)
BILIRUB UR QL STRIP: NEGATIVE
BLD GP AB SCN SERPL QL: NEGATIVE
BUN SERPL-MCNC: 17 MG/DL (ref 8–23)
BUN/CREAT SERPL: 11.6 (ref 7–25)
C PNEUM DNA NPH QL NAA+NON-PROBE: NOT DETECTED
CALCIUM SPEC-SCNC: 9.5 MG/DL (ref 8.6–10.5)
CHLORIDE SERPL-SCNC: 97 MMOL/L (ref 98–107)
CLARITY UR: CLEAR
CO2 SERPL-SCNC: 21 MMOL/L (ref 22–29)
COLOR UR: YELLOW
CREAT SERPL-MCNC: 1.46 MG/DL (ref 0.57–1)
D-LACTATE SERPL-SCNC: 1.9 MMOL/L (ref 0.5–2)
DEPRECATED RDW RBC AUTO: 46.8 FL (ref 37–54)
EOSINOPHIL # BLD AUTO: 0.01 10*3/MM3 (ref 0–0.4)
EOSINOPHIL NFR BLD AUTO: 0.1 % (ref 0.3–6.2)
ERYTHROCYTE [DISTWIDTH] IN BLOOD BY AUTOMATED COUNT: 13 % (ref 12.3–15.4)
ETHANOL BLD-MCNC: 22 MG/DL (ref 0–10)
ETHANOL UR QL: 0.02 %
FLUAV SUBTYP SPEC NAA+PROBE: NOT DETECTED
FLUBV RNA ISLT QL NAA+PROBE: NOT DETECTED
GFR SERPL CREATININE-BSD FRML MDRD: 36 ML/MIN/1.73
GLOBULIN UR ELPH-MCNC: 2.8 GM/DL
GLUCOSE SERPL-MCNC: 111 MG/DL (ref 65–99)
GLUCOSE UR STRIP-MCNC: NEGATIVE MG/DL
HADV DNA SPEC NAA+PROBE: NOT DETECTED
HCOV 229E RNA SPEC QL NAA+PROBE: NOT DETECTED
HCOV HKU1 RNA SPEC QL NAA+PROBE: NOT DETECTED
HCOV NL63 RNA SPEC QL NAA+PROBE: NOT DETECTED
HCOV OC43 RNA SPEC QL NAA+PROBE: NOT DETECTED
HCT VFR BLD AUTO: 40.6 % (ref 34–46.6)
HCT VFR BLD AUTO: 44.7 % (ref 34–46.6)
HGB BLD-MCNC: 13.3 G/DL (ref 12–15.9)
HGB BLD-MCNC: 15.3 G/DL (ref 12–15.9)
HGB UR QL STRIP.AUTO: ABNORMAL
HMPV RNA NPH QL NAA+NON-PROBE: NOT DETECTED
HOLD SPECIMEN: NORMAL
HOLD SPECIMEN: NORMAL
HPIV1 RNA SPEC QL NAA+PROBE: NOT DETECTED
HPIV2 RNA SPEC QL NAA+PROBE: NOT DETECTED
HPIV3 RNA NPH QL NAA+PROBE: NOT DETECTED
HPIV4 P GENE NPH QL NAA+PROBE: NOT DETECTED
HYALINE CASTS UR QL AUTO: ABNORMAL /LPF
IMM GRANULOCYTES # BLD AUTO: 0.09 10*3/MM3 (ref 0–0.05)
IMM GRANULOCYTES NFR BLD AUTO: 0.7 % (ref 0–0.5)
INR PPP: 0.91 (ref 0.9–1.1)
KETONES UR QL STRIP: ABNORMAL
LEUKOCYTE ESTERASE UR QL STRIP.AUTO: NEGATIVE
LIPASE SERPL-CCNC: 46 U/L (ref 13–60)
LYMPHOCYTES # BLD AUTO: 1.61 10*3/MM3 (ref 0.7–3.1)
LYMPHOCYTES NFR BLD AUTO: 12.7 % (ref 19.6–45.3)
M PNEUMO IGG SER IA-ACNC: NOT DETECTED
MCH RBC QN AUTO: 33.7 PG (ref 26.6–33)
MCHC RBC AUTO-ENTMCNC: 34.2 G/DL (ref 31.5–35.7)
MCV RBC AUTO: 98.5 FL (ref 79–97)
MONOCYTES # BLD AUTO: 0.9 10*3/MM3 (ref 0.1–0.9)
MONOCYTES NFR BLD AUTO: 7.1 % (ref 5–12)
NEUTROPHILS NFR BLD AUTO: 10.07 10*3/MM3 (ref 1.7–7)
NEUTROPHILS NFR BLD AUTO: 79.2 % (ref 42.7–76)
NITRITE UR QL STRIP: NEGATIVE
NRBC BLD AUTO-RTO: 0 /100 WBC (ref 0–0.2)
PH UR STRIP.AUTO: 6 [PH] (ref 5–8)
PLATELET # BLD AUTO: 332 10*3/MM3 (ref 140–450)
PMV BLD AUTO: 8.9 FL (ref 6–12)
POTASSIUM SERPL-SCNC: 3.8 MMOL/L (ref 3.5–5.2)
PROT SERPL-MCNC: 7.6 G/DL (ref 6–8.5)
PROT UR QL STRIP: ABNORMAL
PROTHROMBIN TIME: 12.1 SECONDS (ref 11.7–14.2)
RBC # BLD AUTO: 4.54 10*6/MM3 (ref 3.77–5.28)
RBC # UR: ABNORMAL /HPF
REF LAB TEST METHOD: ABNORMAL
RH BLD: POSITIVE
RHINOVIRUS RNA SPEC NAA+PROBE: NOT DETECTED
RSV RNA NPH QL NAA+NON-PROBE: NOT DETECTED
SARS-COV-2 RNA NPH QL NAA+NON-PROBE: NOT DETECTED
SODIUM SERPL-SCNC: 138 MMOL/L (ref 136–145)
SP GR UR STRIP: >=1.03 (ref 1–1.03)
SQUAMOUS #/AREA URNS HPF: ABNORMAL /HPF
T&S EXPIRATION DATE: NORMAL
TSH SERPL DL<=0.05 MIU/L-ACNC: 3.38 UIU/ML (ref 0.27–4.2)
UROBILINOGEN UR QL STRIP: ABNORMAL
WBC # BLD AUTO: 12.71 10*3/MM3 (ref 3.4–10.8)
WBC UR QL AUTO: ABNORMAL /HPF
WHOLE BLOOD HOLD SPECIMEN: NORMAL
WHOLE BLOOD HOLD SPECIMEN: NORMAL

## 2020-12-01 PROCEDURE — G0378 HOSPITAL OBSERVATION PER HR: HCPCS

## 2020-12-01 PROCEDURE — 86901 BLOOD TYPING SEROLOGIC RH(D): CPT | Performed by: PHYSICIAN ASSISTANT

## 2020-12-01 PROCEDURE — 0202U NFCT DS 22 TRGT SARS-COV-2: CPT | Performed by: PHYSICIAN ASSISTANT

## 2020-12-01 PROCEDURE — 90791 PSYCH DIAGNOSTIC EVALUATION: CPT | Performed by: SOCIAL WORKER

## 2020-12-01 PROCEDURE — 85018 HEMOGLOBIN: CPT | Performed by: INTERNAL MEDICINE

## 2020-12-01 PROCEDURE — 85014 HEMATOCRIT: CPT | Performed by: INTERNAL MEDICINE

## 2020-12-01 PROCEDURE — 96376 TX/PRO/DX INJ SAME DRUG ADON: CPT

## 2020-12-01 PROCEDURE — 96366 THER/PROPH/DIAG IV INF ADDON: CPT

## 2020-12-01 PROCEDURE — 96365 THER/PROPH/DIAG IV INF INIT: CPT

## 2020-12-01 PROCEDURE — 63710000001 ONDANSETRON PER 8 MG: Performed by: INTERNAL MEDICINE

## 2020-12-01 PROCEDURE — 99284 EMERGENCY DEPT VISIT MOD MDM: CPT

## 2020-12-01 PROCEDURE — 86850 RBC ANTIBODY SCREEN: CPT | Performed by: PHYSICIAN ASSISTANT

## 2020-12-01 PROCEDURE — 83690 ASSAY OF LIPASE: CPT

## 2020-12-01 PROCEDURE — 84443 ASSAY THYROID STIM HORMONE: CPT | Performed by: NURSE PRACTITIONER

## 2020-12-01 PROCEDURE — 25010000002 HYDROMORPHONE PER 4 MG: Performed by: EMERGENCY MEDICINE

## 2020-12-01 PROCEDURE — 80053 COMPREHEN METABOLIC PANEL: CPT

## 2020-12-01 PROCEDURE — 25010000002 ONDANSETRON PER 1 MG: Performed by: PHYSICIAN ASSISTANT

## 2020-12-01 PROCEDURE — 74177 CT ABD & PELVIS W/CONTRAST: CPT

## 2020-12-01 PROCEDURE — 43235 EGD DIAGNOSTIC BRUSH WASH: CPT | Performed by: INTERNAL MEDICINE

## 2020-12-01 PROCEDURE — 81001 URINALYSIS AUTO W/SCOPE: CPT | Performed by: EMERGENCY MEDICINE

## 2020-12-01 PROCEDURE — 86900 BLOOD TYPING SEROLOGIC ABO: CPT | Performed by: PHYSICIAN ASSISTANT

## 2020-12-01 PROCEDURE — 96375 TX/PRO/DX INJ NEW DRUG ADDON: CPT

## 2020-12-01 PROCEDURE — 99244 OFF/OP CNSLTJ NEW/EST MOD 40: CPT | Performed by: INTERNAL MEDICINE

## 2020-12-01 PROCEDURE — 85730 THROMBOPLASTIN TIME PARTIAL: CPT | Performed by: PHYSICIAN ASSISTANT

## 2020-12-01 PROCEDURE — 25010000002 IOPAMIDOL 61 % SOLUTION: Performed by: EMERGENCY MEDICINE

## 2020-12-01 PROCEDURE — 25010000002 PROPOFOL 10 MG/ML EMULSION: Performed by: NURSE ANESTHETIST, CERTIFIED REGISTERED

## 2020-12-01 PROCEDURE — 25010000002 THIAMINE PER 100 MG: Performed by: INTERNAL MEDICINE

## 2020-12-01 PROCEDURE — 85025 COMPLETE CBC W/AUTO DIFF WBC: CPT

## 2020-12-01 PROCEDURE — 80307 DRUG TEST PRSMV CHEM ANLYZR: CPT

## 2020-12-01 PROCEDURE — 83605 ASSAY OF LACTIC ACID: CPT | Performed by: PHYSICIAN ASSISTANT

## 2020-12-01 PROCEDURE — 85610 PROTHROMBIN TIME: CPT | Performed by: PHYSICIAN ASSISTANT

## 2020-12-01 PROCEDURE — 63710000001 ONDANSETRON PER 8 MG: Performed by: HOSPITALIST

## 2020-12-01 RX ORDER — ONDANSETRON 4 MG/1
4 TABLET, FILM COATED ORAL EVERY 6 HOURS PRN
Status: DISCONTINUED | OUTPATIENT
Start: 2020-12-01 | End: 2020-12-02 | Stop reason: HOSPADM

## 2020-12-01 RX ORDER — CLONIDINE HYDROCHLORIDE 0.1 MG/1
0.1 TABLET ORAL EVERY 4 HOURS PRN
Status: DISCONTINUED | OUTPATIENT
Start: 2020-12-01 | End: 2020-12-02 | Stop reason: HOSPADM

## 2020-12-01 RX ORDER — SODIUM CHLORIDE 0.9 % (FLUSH) 0.9 %
10 SYRINGE (ML) INJECTION EVERY 12 HOURS SCHEDULED
Status: DISCONTINUED | OUTPATIENT
Start: 2020-12-01 | End: 2020-12-02 | Stop reason: HOSPADM

## 2020-12-01 RX ORDER — SODIUM CHLORIDE, SODIUM LACTATE, POTASSIUM CHLORIDE, CALCIUM CHLORIDE 600; 310; 30; 20 MG/100ML; MG/100ML; MG/100ML; MG/100ML
75 INJECTION, SOLUTION INTRAVENOUS CONTINUOUS
Status: DISCONTINUED | OUTPATIENT
Start: 2020-12-01 | End: 2020-12-02

## 2020-12-01 RX ORDER — FOLIC ACID 1 MG/1
1 TABLET ORAL DAILY
Status: DISCONTINUED | OUTPATIENT
Start: 2020-12-02 | End: 2020-12-02 | Stop reason: HOSPADM

## 2020-12-01 RX ORDER — DIPHENOXYLATE HYDROCHLORIDE AND ATROPINE SULFATE 2.5; .025 MG/1; MG/1
1 TABLET ORAL DAILY
Status: DISCONTINUED | OUTPATIENT
Start: 2020-12-02 | End: 2020-12-02 | Stop reason: HOSPADM

## 2020-12-01 RX ORDER — LABETALOL HYDROCHLORIDE 5 MG/ML
10 INJECTION, SOLUTION INTRAVENOUS ONCE
Status: DISCONTINUED | OUTPATIENT
Start: 2020-12-01 | End: 2020-12-02 | Stop reason: HOSPADM

## 2020-12-01 RX ORDER — THIAMINE MONONITRATE (VIT B1) 100 MG
100 TABLET ORAL ONCE
Status: COMPLETED | OUTPATIENT
Start: 2020-12-01 | End: 2020-12-01

## 2020-12-01 RX ORDER — SUCRALFATE 1 G/1
1 TABLET ORAL
Status: DISCONTINUED | OUTPATIENT
Start: 2020-12-01 | End: 2020-12-02 | Stop reason: HOSPADM

## 2020-12-01 RX ORDER — PROPOFOL 10 MG/ML
VIAL (ML) INTRAVENOUS AS NEEDED
Status: DISCONTINUED | OUTPATIENT
Start: 2020-12-01 | End: 2020-12-01 | Stop reason: SURG

## 2020-12-01 RX ORDER — ONDANSETRON 2 MG/ML
4 INJECTION INTRAMUSCULAR; INTRAVENOUS ONCE
Status: COMPLETED | OUTPATIENT
Start: 2020-12-01 | End: 2020-12-01

## 2020-12-01 RX ORDER — SODIUM CHLORIDE 0.9 % (FLUSH) 0.9 %
10 SYRINGE (ML) INJECTION AS NEEDED
Status: DISCONTINUED | OUTPATIENT
Start: 2020-12-01 | End: 2020-12-02 | Stop reason: HOSPADM

## 2020-12-01 RX ORDER — PROPOFOL 10 MG/ML
VIAL (ML) INTRAVENOUS CONTINUOUS PRN
Status: DISCONTINUED | OUTPATIENT
Start: 2020-12-01 | End: 2020-12-01 | Stop reason: SURG

## 2020-12-01 RX ORDER — SODIUM CHLORIDE 9 MG/ML
50 INJECTION, SOLUTION INTRAVENOUS CONTINUOUS
Status: DISCONTINUED | OUTPATIENT
Start: 2020-12-01 | End: 2020-12-02

## 2020-12-01 RX ORDER — HYDROMORPHONE HYDROCHLORIDE 1 MG/ML
0.5 INJECTION, SOLUTION INTRAMUSCULAR; INTRAVENOUS; SUBCUTANEOUS ONCE
Status: COMPLETED | OUTPATIENT
Start: 2020-12-01 | End: 2020-12-01

## 2020-12-01 RX ORDER — LORAZEPAM 2 MG/ML
2 INJECTION INTRAMUSCULAR
Status: DISCONTINUED | OUTPATIENT
Start: 2020-12-01 | End: 2020-12-02 | Stop reason: HOSPADM

## 2020-12-01 RX ORDER — LORAZEPAM 1 MG/1
2 TABLET ORAL
Status: DISCONTINUED | OUTPATIENT
Start: 2020-12-01 | End: 2020-12-02 | Stop reason: HOSPADM

## 2020-12-01 RX ORDER — LORAZEPAM 2 MG/ML
1 INJECTION INTRAMUSCULAR
Status: DISCONTINUED | OUTPATIENT
Start: 2020-12-01 | End: 2020-12-02 | Stop reason: HOSPADM

## 2020-12-01 RX ORDER — LORAZEPAM 1 MG/1
1 TABLET ORAL
Status: DISCONTINUED | OUTPATIENT
Start: 2020-12-01 | End: 2020-12-02 | Stop reason: HOSPADM

## 2020-12-01 RX ORDER — GLYCOPYRROLATE 0.2 MG/ML
INJECTION INTRAMUSCULAR; INTRAVENOUS AS NEEDED
Status: DISCONTINUED | OUTPATIENT
Start: 2020-12-01 | End: 2020-12-01 | Stop reason: SURG

## 2020-12-01 RX ORDER — ACETAMINOPHEN 325 MG/1
650 TABLET ORAL EVERY 4 HOURS PRN
Status: DISCONTINUED | OUTPATIENT
Start: 2020-12-01 | End: 2020-12-02 | Stop reason: HOSPADM

## 2020-12-01 RX ORDER — LABETALOL HYDROCHLORIDE 5 MG/ML
10 INJECTION, SOLUTION INTRAVENOUS EVERY 6 HOURS PRN
Status: DISCONTINUED | OUTPATIENT
Start: 2020-12-01 | End: 2020-12-02 | Stop reason: HOSPADM

## 2020-12-01 RX ORDER — THIAMINE MONONITRATE (VIT B1) 100 MG
100 TABLET ORAL DAILY
Status: DISCONTINUED | OUTPATIENT
Start: 2020-12-02 | End: 2020-12-02 | Stop reason: HOSPADM

## 2020-12-01 RX ORDER — PANTOPRAZOLE SODIUM 40 MG/10ML
80 INJECTION, POWDER, LYOPHILIZED, FOR SOLUTION INTRAVENOUS ONCE
Status: COMPLETED | OUTPATIENT
Start: 2020-12-01 | End: 2020-12-01

## 2020-12-01 RX ORDER — PANTOPRAZOLE SODIUM 40 MG/1
40 TABLET, DELAYED RELEASE ORAL
Status: DISCONTINUED | OUTPATIENT
Start: 2020-12-01 | End: 2020-12-02 | Stop reason: HOSPADM

## 2020-12-01 RX ORDER — LIDOCAINE HYDROCHLORIDE 20 MG/ML
INJECTION, SOLUTION INFILTRATION; PERINEURAL AS NEEDED
Status: DISCONTINUED | OUTPATIENT
Start: 2020-12-01 | End: 2020-12-01 | Stop reason: SURG

## 2020-12-01 RX ADMIN — SODIUM CHLORIDE 50 ML/HR: 9 INJECTION, SOLUTION INTRAVENOUS at 13:49

## 2020-12-01 RX ADMIN — SUCRALFATE 1 G: 1 TABLET ORAL at 20:36

## 2020-12-01 RX ADMIN — PROPOFOL 50 MG: 10 INJECTION, EMULSION INTRAVENOUS at 14:31

## 2020-12-01 RX ADMIN — ACETAMINOPHEN 650 MG: 325 TABLET, FILM COATED ORAL at 21:16

## 2020-12-01 RX ADMIN — HYDROMORPHONE HYDROCHLORIDE 0.5 MG: 1 INJECTION, SOLUTION INTRAMUSCULAR; INTRAVENOUS; SUBCUTANEOUS at 08:21

## 2020-12-01 RX ADMIN — THIAMINE HYDROCHLORIDE 100 MG: 100 INJECTION, SOLUTION INTRAMUSCULAR; INTRAVENOUS at 17:12

## 2020-12-01 RX ADMIN — LIDOCAINE HYDROCHLORIDE 60 MG: 20 INJECTION, SOLUTION INFILTRATION; PERINEURAL at 14:31

## 2020-12-01 RX ADMIN — SODIUM CHLORIDE 8 MG/HR: 900 INJECTION INTRAVENOUS at 08:24

## 2020-12-01 RX ADMIN — SODIUM CHLORIDE 50 ML/HR: 9 INJECTION, SOLUTION INTRAVENOUS at 15:48

## 2020-12-01 RX ADMIN — PROPOFOL 100 MCG/KG/MIN: 10 INJECTION, EMULSION INTRAVENOUS at 14:31

## 2020-12-01 RX ADMIN — ACETAMINOPHEN 650 MG: 325 TABLET, FILM COATED ORAL at 11:44

## 2020-12-01 RX ADMIN — ONDANSETRON 4 MG: 2 INJECTION INTRAMUSCULAR; INTRAVENOUS at 08:21

## 2020-12-01 RX ADMIN — GLYCOPYRROLATE 0.2 MG: 0.2 INJECTION INTRAMUSCULAR; INTRAVENOUS at 14:30

## 2020-12-01 RX ADMIN — PANTOPRAZOLE SODIUM 40 MG: 40 TABLET, DELAYED RELEASE ORAL at 17:12

## 2020-12-01 RX ADMIN — SODIUM CHLORIDE, PRESERVATIVE FREE 10 ML: 5 INJECTION INTRAVENOUS at 20:36

## 2020-12-01 RX ADMIN — ONDANSETRON 4 MG: 2 INJECTION INTRAMUSCULAR; INTRAVENOUS at 07:34

## 2020-12-01 RX ADMIN — ONDANSETRON HYDROCHLORIDE 4 MG: 4 TABLET, FILM COATED ORAL at 11:44

## 2020-12-01 RX ADMIN — SODIUM CHLORIDE 1000 ML: 9 INJECTION, SOLUTION INTRAVENOUS at 08:20

## 2020-12-01 RX ADMIN — SODIUM CHLORIDE, POTASSIUM CHLORIDE, SODIUM LACTATE AND CALCIUM CHLORIDE: 600; 310; 30; 20 INJECTION, SOLUTION INTRAVENOUS at 14:01

## 2020-12-01 RX ADMIN — IOPAMIDOL 100 ML: 612 INJECTION, SOLUTION INTRAVENOUS at 08:37

## 2020-12-01 RX ADMIN — LABETALOL HYDROCHLORIDE 10 MG: 5 INJECTION, SOLUTION INTRAVENOUS at 20:35

## 2020-12-01 RX ADMIN — PANTOPRAZOLE SODIUM 80 MG: 40 INJECTION, POWDER, FOR SOLUTION INTRAVENOUS at 08:20

## 2020-12-01 RX ADMIN — PROPOFOL 50 MG: 10 INJECTION, EMULSION INTRAVENOUS at 14:07

## 2020-12-01 RX ADMIN — SODIUM CHLORIDE, PRESERVATIVE FREE 10 ML: 5 INJECTION INTRAVENOUS at 11:45

## 2020-12-01 NOTE — CONSULTS
"[facility] evaluated 65-year-old female for alcohol abuse.  Patient came in with nausea and vomiting and was found to have moderate level of fatty liver and gastritis.  Patient was not wanting any resources for alcohol cessation but was willing to give some information.  Patient denies any SI and states she has no history of it.  Patient sees a nurse practitioner at [facility] and takes Wellbutrin XL, Lexapro, Neurontin and trazodone.  Patient rated her anxiety as a \"6\" and states she has no depression at this time.  Patient denies any  inpatient hospitalization for mental health purposes and states that she attended some AV treatment many years ago.  Patient states she attended AA many years ago but is not interested in that currently.  Patient states she has never had any bad withdrawal symptoms in the past.  Patient states she drinks 3 beers and 3 vodka drinks daily.  Patient states her sleep \"varies\" and that her appetite is typically good but has been poor the last few days.    Patient lives in a condo by herself has not been  has no children and is not working.  Patient states she has family as a support system but did not give details.  Patient states she has leisure skills where she cooks and walks and watches TV.  [facility] will follow briefly to see if patient changes her mind about getting resources.  "

## 2020-12-01 NOTE — H&P
"    Patient Name:  Ronda Steve  YOB: 1955  MRN:  [id number]  Admit Date:  12/1/2020  Patient Care Team:  Provider, No Known as PCP - General      Subjective   History Present Illness     Chief Complaint   Patient presents with   • Alcohol Intoxication   • Vomiting       Ms. Steve is a 65 y.o. smoker with a history of alcohol abuse, gastritis, GI bleed, resolved hepatitis c, untreated hypertension that presents to [facility] complaining of nausea and vomiting with hematemesis.  Onset of symptoms yesterday and she has vomited approximately 20 times since onset.  Associated symptoms includes epigastric pain and lower abdominal pain.  Abdominal pain is described as an ache.  No obvious alleviating or exacerbating factors.  She denies melena or hematochezia.  Patient admitted for GI bleed last February with EGD demonstrating gastritis. She reports a history of hepatitis c but was told by a doctor it \"went away.\"  No prior diagnosis of cirrhosis but has hepatic steatosis on imaging.  She is a chronic alcoholic.  Last drink yesterday which she consumed 3 beers and 3 vodka drinks.  She is somewhat elusive about her alcohol consumption.  She denies a history of alcohol withdrawal symptoms or EtOH related seizures. She smokes 1/2 pack of cigarettes per day.  She reports she is to use NSAIDs heavily but now only takes Tylenol.          History of Present Illness  Review of Systems   Constitutional: Negative for activity change, appetite change, fatigue, fever and unexpected weight change.   HENT: Negative for trouble swallowing.    Respiratory: Negative for cough, choking, chest tightness and shortness of breath.    Cardiovascular: Negative for chest pain, palpitations and leg swelling.   Gastrointestinal: Positive for abdominal pain, nausea and vomiting. Negative for abdominal distention, blood in stool, constipation and diarrhea.   Endocrine: Negative for polydipsia, polyphagia and " polyuria.   Genitourinary: Negative for dysuria.   Musculoskeletal: Negative for back pain.   Skin: Negative for color change.   Allergic/Immunologic: Negative for immunocompromised state.   Neurological: Negative for dizziness, weakness and headaches.   Hematological: Does not bruise/bleed easily.   Psychiatric/Behavioral: Negative.  Negative for confusion.        Personal History     Past Medical History:   Diagnosis Date   • Alcoholic hepatitis    • Disease of thyroid gland    • Hepatitis C    • Hyperlipidemia      Past Surgical History:   Procedure Laterality Date   • ENDOSCOPY N/A 2/14/2020    Procedure: ESOPHAGOGASTRODUODENOSCOPY;  Surgeon: Jordan Iqbal MD;  Location: [facility] ENDOSCOPY;  Service: Gastroenterology;  Laterality: N/A;  PRE:  HEMATEMESIS  POST:  NORMAL EGD   • HIP ARTHROPLASTY      right   • TONSILLECTOMY       Family History   Problem Relation Age of Onset   • Cancer Mother         breast     Social History     Tobacco Use   • Smoking status: Current Every Day Smoker     Packs/day: 0.50     Years: 48.00     Pack years: 24.00   • Smokeless tobacco: Never Used   Substance Use Topics   • Alcohol use: Yes     Alcohol/week: 6.0 - 7.0 standard drinks     Types: 4 Cans of beer, 2 - 3 Shots of liquor per week   • Drug use: Yes     Frequency: 2.0 times per week     Types: Marijuana     No current facility-administered medications on file prior to encounter.      Current Outpatient Medications on File Prior to Encounter   Medication Sig Dispense Refill   • acetaminophen (TYLENOL) 325 MG tablet Take 2 tablets by mouth 3 (Three) Times a Day As Needed for Mild Pain .     • buPROPion XL (WELLBUTRIN XL) 150 MG 24 hr tablet Take 300 mg by mouth Daily.     • escitalopram (LEXAPRO) 20 MG tablet Take 20 mg by mouth Daily.     • famotidine (PEPCID) 20 MG tablet TAKE 1 TABLET BY MOUTH DAILY 30 tablet 5   • gabapentin (NEURONTIN) 300 MG capsule Take 300 mg by mouth 3 (Three) Times a Day.     • lisinopril  (PRINIVIL,ZESTRIL) 10 MG tablet TAKE 1 TABLET BY MOUTH DAILY 90 tablet 1   • NIFEdipine CC (ADALAT CC) 30 MG 24 hr tablet TAKE 1 TABLET BY MOUTH DAILY 90 tablet 1   • traZODone (DESYREL) 100 MG tablet Take 100 mg by mouth Every Night.       No Known Allergies    Objective    Objective     Vital Signs  Temp:  [98.8 °F (37.1 °C)] 98.8 °F (37.1 °C)  Heart Rate:  [76-89] 76  Resp:  [16-18] 18  BP: (167-182)/() 167/87  SpO2:  [92 %-96 %] 94 %  on   ;   Device (Oxygen Therapy): room air  Body mass index is 25.61 kg/m².    Physical Exam  Vitals signs and nursing note reviewed.   Constitutional:       Appearance: She is well-developed.      Comments: Chronically ill-appearing female.  Facial and neck flushing   HENT:      Head: Normocephalic and atraumatic.   Eyes:      Conjunctiva/sclera:      Right eye: Right conjunctiva is injected.      Left eye: Left conjunctiva is injected.      Pupils: Pupils are equal, round, and reactive to light.      Comments: Mild exophthalmos bilaterally   Neck:      Musculoskeletal: Normal range of motion.   Cardiovascular:      Rate and Rhythm: Normal rate and regular rhythm.      Heart sounds: Normal heart sounds.   Pulmonary:      Effort: Pulmonary effort is normal.      Breath sounds: Normal breath sounds.   Abdominal:      General: Bowel sounds are normal. There is no distension or abdominal bruit.      Palpations: Abdomen is soft. Abdomen is not rigid. There is no shifting dullness, fluid wave, mass or pulsatile mass.      Tenderness: There is no abdominal tenderness. There is no guarding.      Hernia: No hernia is present.      Comments: Soft, round, distended, mild tenderness epigastric region and lower abdomen   Musculoskeletal: Normal range of motion.      Right lower leg: No edema.      Left lower leg: No edema.   Skin:     General: Skin is warm and dry.   Neurological:      General: No focal deficit present.      Mental Status: She is alert and oriented to person, place, and  time. Mental status is at baseline.   Psychiatric:         Speech: Speech normal.         Behavior: Behavior is cooperative.         Results Review:  I reviewed the patient's new clinical results.  I reviewed the patient's new imaging results and agree with the interpretation.  I reviewed the patient's other test results and agree with the interpretation  I personally viewed and interpreted the patient's EKG/Telemetry data  Discussed with ED provider.    Lab Results (last 24 hours)     Procedure Component Value Units Date/Time    CBC & Differential [[id number]]  (Abnormal) Collected: 12/01/20 0631    Specimen: Blood Updated: 12/01/20 0651    Narrative:      The following orders were created for panel order CBC & Differential.  Procedure                               Abnormality         Status                     ---------                               -----------         ------                     CBC Auto Differential[[id number]]        Abnormal            Final result                 Please view results for these tests on the individual orders.    Comprehensive Metabolic Panel [[id number]]  (Abnormal) Collected: 12/01/20 0631    Specimen: Blood Updated: 12/01/20 0713     Glucose 111 mg/dL      BUN 17 mg/dL      Creatinine 1.46 mg/dL      Sodium 138 mmol/L      Potassium 3.8 mmol/L      Comment: Slight hemolysis detected by analyzer. Results may be affected.        Chloride 97 mmol/L      CO2 21.0 mmol/L      Calcium 9.5 mg/dL      Total Protein 7.6 g/dL      Albumin 4.80 g/dL      ALT (SGPT) 39 U/L      AST (SGOT) 36 U/L      Comment: Slight hemolysis detected by analyzer. Results may be affected.        Alkaline Phosphatase 157 U/L      Total Bilirubin 0.3 mg/dL      eGFR Non African Amer 36 mL/min/1.73      Globulin 2.8 gm/dL      A/G Ratio 1.7 g/dL      BUN/Creatinine Ratio 11.6     Anion Gap 20.0 mmol/L     Narrative:      GFR Normal >60  Chronic Kidney Disease <60  Kidney Failure <15      Lipase [[id number]]   (Normal) Collected: 12/01/20 0631    Specimen: Blood Updated: 12/01/20 0711     Lipase 46 U/L     Ethanol [[id number]]  (Abnormal) Collected: 12/01/20 0631    Specimen: Blood Updated: 12/01/20 0711     Ethanol 22 mg/dL      Ethanol % 0.022 %     CBC Auto Differential [[id number]]  (Abnormal) Collected: 12/01/20 0631    Specimen: Blood Updated: 12/01/20 0651     WBC 12.71 10*3/mm3      RBC 4.54 10*6/mm3      Hemoglobin 15.3 g/dL      Hematocrit 44.7 %      MCV 98.5 fL      MCH 33.7 pg      MCHC 34.2 g/dL      RDW 13.0 %      RDW-SD 46.8 fl      MPV 8.9 fL      Platelets 332 10*3/mm3      Neutrophil % 79.2 %      Lymphocyte % 12.7 %      Monocyte % 7.1 %      Eosinophil % 0.1 %      Basophil % 0.2 %      Immature Grans % 0.7 %      Neutrophils, Absolute 10.07 10*3/mm3      Lymphocytes, Absolute 1.61 10*3/mm3      Monocytes, Absolute 0.90 10*3/mm3      Eosinophils, Absolute 0.01 10*3/mm3      Basophils, Absolute 0.03 10*3/mm3      Immature Grans, Absolute 0.09 10*3/mm3      nRBC 0.0 /100 WBC     Protime-INR [[id number]]  (Normal) Collected: 12/01/20 0631    Specimen: Blood Updated: 12/01/20 0706     Protime 12.1 Seconds      INR 0.91    aPTT [[id number]]  (Normal) Collected: 12/01/20 0631    Specimen: Blood Updated: 12/01/20 0706     PTT 28.5 seconds     TSH [[id number]] Collected: 12/01/20 0631    Specimen: Blood Updated: 12/01/20 1340    Lactic Acid, Plasma [[id number]]  (Normal) Collected: 12/01/20 0730    Specimen: Blood Updated: 12/01/20 0806     Lactate 1.9 mmol/L     COVID PRE-OP / PRE-PROCEDURE SCREENING ORDER (NO ISOLATION) - Swab, Nasopharynx [[id number]]  (Normal) Collected: 12/01/20 0730    Specimen: Swab from Nasopharynx Updated: 12/01/20 1048    Narrative:      The following orders were created for panel order COVID PRE-OP / PRE-PROCEDURE SCREENING ORDER (NO ISOLATION) - Swab, Nasopharynx.  Procedure                               Abnormality         Status                     ---------                                -----------         ------                     Respiratory Panel PCR w/...[[id number]]  Normal              Final result                 Please view results for these tests on the individual orders.    Respiratory Panel PCR w/COVID-19(SARS-CoV-2) ISHAAN/NIKHIL/KODAK/PAD/COR/MAD/DANAY In-House, NP Swab in UTM/VTM, 3-4 HR TAT - Swab, Nasopharynx [[id number]]  (Normal) Collected: 12/01/20 0730    Specimen: Swab from Nasopharynx Updated: 12/01/20 1048     ADENOVIRUS, PCR Not Detected     Coronavirus 229E Not Detected     Coronavirus HKU1 Not Detected     Coronavirus NL63 Not Detected     Coronavirus OC43 Not Detected     COVID19 Not Detected     Human Metapneumovirus Not Detected     Human Rhinovirus/Enterovirus Not Detected     Influenza A PCR Not Detected     Influenza B PCR Not Detected     Parainfluenza Virus 1 Not Detected     Parainfluenza Virus 2 Not Detected     Parainfluenza Virus 3 Not Detected     Parainfluenza Virus 4 Not Detected     RSV, PCR Not Detected     Bordetella pertussis pcr Not Detected     Bordetella parapertussis PCR Not Detected     Chlamydophila pneumoniae PCR Not Detected     Mycoplasma pneumo by PCR Not Detected    Narrative:      Fact sheet for providers: https://docs.[employer]/wp-content/uploads/AUY3084-0378-ZL0.1-EUA-Provider-Fact-Sheet-3.pdf    Fact sheet for patients: https://docs.[employer]/wp-content/uploads/JEM3753-6323-GD8.1-EUA-Patient-Fact-Sheet-1.pdf    Urinalysis With Microscopic If Indicated (No Culture) - Urine, Clean Catch [[id number]]  (Abnormal) Collected: 12/01/20 1025    Specimen: Urine, Clean Catch Updated: 12/01/20 1052     Color, UA Yellow     Appearance, UA Clear     pH, UA 6.0     Specific Gravity, UA >=1.030     Glucose, UA Negative     Ketones, UA Trace     Bilirubin, UA Negative     Blood, UA Trace     Protein, UA 30 mg/dL (1+)     Leuk Esterase, UA Negative     Nitrite, UA Negative     Urobilinogen, UA 1.0 E.U./dL    Urinalysis, Microscopic Only - Urine, Clean Catch  [[id number]]  (Abnormal) Collected: 12/01/20 1025    Specimen: Urine, Clean Catch Updated: 12/01/20 1052     RBC, UA 3-5 /HPF      WBC, UA 0-2 /HPF      Bacteria, UA None Seen /HPF      Squamous Epithelial Cells, UA 0-2 /HPF      Hyaline Casts, UA 0-2 /LPF      Methodology Automated Microscopy          Imaging Results (Last 24 Hours)     Procedure Component Value Units Date/Time    CT Abdomen Pelvis With Contrast [[id number]] Collected: 12/01/20 0927     Updated: 12/01/20 0927    Narrative:      CT ABDOMEN AND PELVIS WITH IV CONTRAST     HISTORY: 65-year-old female with upper abdominal pain and hemoptysis.  Alcohol abuse. Hepatitis C.     TECHNIQUE: Radiation dose reduction techniques were utilized, including  automated exposure control and exposure modulation based on body size.   3 mm images were obtained through the abdomen and pelvis after the  administration of IV contrast. Compared with previous CT from  04/29/2014.     FINDINGS: There is moderately extensive fatty infiltration of the liver  and the liver appears enlarged. The gallbladder appears unremarkable and  there is no biliary dilatation. Splenic size is normal. There is no  change in the mild ectasia of the pancreatic duct. There is no evidence  for acute or chronic pancreatitis. There is stable hyperplasia of the  left adrenal gland. Right adrenal gland and kidneys appear unremarkable  other than a single nonobstructing stone within the left kidney. The  gastric body has a thickened edematous appearing wall. There is a  paucity of formed stool within the colon. No colonic thickening is seen.  The appendix appears normal. Uterus and left adnexa appear unremarkable.  There is a 4.6 cm right ovarian cyst, previously measuring 3.8 cm. There  is no free fluid or lymphadenopathy. There are mild-moderate abdominal  aortic atherosclerotic changes without aneurysmal dilatation.       Impression:      1. There is likely acute gastritis.  2. Moderately extensive  hepatic steatosis.  3. Gynecology follow-up is recommended for the 4.6 cm right ovarian  cyst.     Discussed with ELSA Machado.                  No orders to display        Assessment/Plan     Active Hospital Problems    Diagnosis  POA   • **Gastrointestinal hemorrhage with hematemesis [K92.0]  Yes   • Essential hypertension [I10]  Yes   • History of hepatitis C [Z86.19]  Yes   • DEBI (acute kidney injury) (CMS/HCC) [N17.9]  Yes   • Hepatic steatosis [K76.0]  Yes   • Elevated LFTs [R79.89]  Unknown   • Cyst of right ovary [N83.201]  Unknown   • Metabolic acidosis, increased anion gap [E87.2]  Unknown   • Depression with anxiety [F41.8]  Yes   • Alcoholism /alcohol abuse (CMS/HCC) [F10.20]  Yes   • Alcohol intoxication (CMS/HCC) [F10.929]  Yes      Resolved Hospital Problems   No resolved problems to display.       Ms. Steve is a 65 y.o. smoker with a history of alcohol abuse that presents with nausea vomiting and hematemesis.  Hemoglobin is stable and BUN within normal limits.  Suspect source is gastritis and/or PUD secondary to alcohol abuse.    Acute GI bleed, upper  · Protonix drip   · EGD today with GI  · Serial H&H. Repeat H&H now since last checked 630am  · Leukocytosis is likely reactive.  No other infectious symptoms.  Denies COVID-19 symptoms.    Acute alcohol intoxication/chronic alcohol abuse/hepatic steatosis/elevated LFTs  · Trend LFTs.  Synthetic liver function intact.   · Inconsistent with regards to amount of alcohol intake.  Start CIWA protocol and consult access  ·  EtOH cessation is imperative  · Check B12 folate     DEBI/ metabolic acidosis due to ETOH abuse   · Secondary toShe has DEBI with rise in creatinine to 1.46 from baseline of less then 1 .  This is likely secondary to volume depletion with nausea, vomiting, and alcohol abuse.   · Avoid NSAIDS/nephrotoxic agents and renally dose medications  · Daily CMP.    Hypothyroidism  · Check TSH as I doubt her adherence to home medications  including thyroid replacement.  exophthalmos on exam    Hypertension, uncontrolled  · Discharged from this facility in February on antihypertensive agents.  She seems surprised that her blood pressure elevated now and denies taking any pressure medication.  · labetalol IV now but resume medications prescribed at discharge last admission. Resume Nifedipine but hold lisinopril with DEBI    Right ovarian cyst  · Patient need to follow-up with gynecology.      · I discussed the patient's findings and my recommendations with patient, nursing staff and ED provider.    VTE Prophylaxis - SCDs.  Code Status - Full code.       PRICE [facility]  [facility]  12/01/20  13:48 EST

## 2020-12-01 NOTE — ED NOTES
Patient was placed in face mask in first look.  Patient was wearing a face mask throughout our encounter.  I wore protective eye protection throughout the encounter.  Hand hygiene was performed before and after patient encounter.        Rashard Sanchez RN  12/01/20 2326

## 2020-12-01 NOTE — ED PROVIDER NOTES
MD ATTESTATION NOTE    The JEAN and I have discussed this patient's history, physical exam, and treatment plan.  I have reviewed the documentation and personally had a face to face interaction with the patient. I affirm the documentation and agree with the treatment and plan.  The attached note describes my personal findings.      Ronda Steve is a 65 y.o. female who presents to the ED c/o vomiting blood.  Onset 4 PM yesterday.  She reports greater than 20 episodes of bloody emesis.      On exam:  Abdomen soft and nontender    Labs  Recent Results (from the past 24 hour(s))   Comprehensive Metabolic Panel    Collection Time: 12/01/20  6:31 AM    Specimen: Blood   Result Value Ref Range    Glucose 111 (H) 65 - 99 mg/dL    BUN 17 8 - 23 mg/dL    Creatinine 1.46 (H) 0.57 - 1.00 mg/dL    Sodium 138 136 - 145 mmol/L    Potassium 3.8 3.5 - 5.2 mmol/L    Chloride 97 (L) 98 - 107 mmol/L    CO2 21.0 (L) 22.0 - 29.0 mmol/L    Calcium 9.5 8.6 - 10.5 mg/dL    Total Protein 7.6 6.0 - 8.5 g/dL    Albumin 4.80 3.50 - 5.20 g/dL    ALT (SGPT) 39 (H) 1 - 33 U/L    AST (SGOT) 36 (H) 1 - 32 U/L    Alkaline Phosphatase 157 (H) 39 - 117 U/L    Total Bilirubin 0.3 0.0 - 1.2 mg/dL    eGFR Non African Amer 36 (L) >60 mL/min/1.73    Globulin 2.8 gm/dL    A/G Ratio 1.7 g/dL    BUN/Creatinine Ratio 11.6 7.0 - 25.0    Anion Gap 20.0 (H) 5.0 - 15.0 mmol/L   Lipase    Collection Time: 12/01/20  6:31 AM    Specimen: Blood   Result Value Ref Range    Lipase 46 13 - 60 U/L   Ethanol    Collection Time: 12/01/20  6:31 AM    Specimen: Blood   Result Value Ref Range    Ethanol 22 (H) 0 - 10 mg/dL    Ethanol % 0.022 %   Light Blue Top    Collection Time: 12/01/20  6:31 AM   Result Value Ref Range    Extra Tube hold for add-on    Green Top (Gel)    Collection Time: 12/01/20  6:31 AM   Result Value Ref Range    Extra Tube Hold for add-ons.    Lavender Top    Collection Time: 12/01/20  6:31 AM   Result Value Ref Range    Extra Tube hold for add-on     Gold Top - SST    Collection Time: 12/01/20  6:31 AM   Result Value Ref Range    Extra Tube Hold for add-ons.    CBC Auto Differential    Collection Time: 12/01/20  6:31 AM    Specimen: Blood   Result Value Ref Range    WBC 12.71 (H) 3.40 - 10.80 10*3/mm3    RBC 4.54 3.77 - 5.28 10*6/mm3    Hemoglobin 15.3 12.0 - 15.9 g/dL    Hematocrit 44.7 34.0 - 46.6 %    MCV 98.5 (H) 79.0 - 97.0 fL    MCH 33.7 (H) 26.6 - 33.0 pg    MCHC 34.2 31.5 - 35.7 g/dL    RDW 13.0 12.3 - 15.4 %    RDW-SD 46.8 37.0 - 54.0 fl    MPV 8.9 6.0 - 12.0 fL    Platelets 332 140 - 450 10*3/mm3    Neutrophil % 79.2 (H) 42.7 - 76.0 %    Lymphocyte % 12.7 (L) 19.6 - 45.3 %    Monocyte % 7.1 5.0 - 12.0 %    Eosinophil % 0.1 (L) 0.3 - 6.2 %    Basophil % 0.2 0.0 - 1.5 %    Immature Grans % 0.7 (H) 0.0 - 0.5 %    Neutrophils, Absolute 10.07 (H) 1.70 - 7.00 10*3/mm3    Lymphocytes, Absolute 1.61 0.70 - 3.10 10*3/mm3    Monocytes, Absolute 0.90 0.10 - 0.90 10*3/mm3    Eosinophils, Absolute 0.01 0.00 - 0.40 10*3/mm3    Basophils, Absolute 0.03 0.00 - 0.20 10*3/mm3    Immature Grans, Absolute 0.09 (H) 0.00 - 0.05 10*3/mm3    nRBC 0.0 0.0 - 0.2 /100 WBC   Protime-INR    Collection Time: 12/01/20  6:31 AM    Specimen: Blood   Result Value Ref Range    Protime 12.1 11.7 - 14.2 Seconds    INR 0.91 0.90 - 1.10   aPTT    Collection Time: 12/01/20  6:31 AM    Specimen: Blood   Result Value Ref Range    PTT 28.5 22.7 - 35.4 seconds   Lactic Acid, Plasma    Collection Time: 12/01/20  7:30 AM    Specimen: Blood   Result Value Ref Range    Lactate 1.9 0.5 - 2.0 mmol/L   Type & Screen    Collection Time: 12/01/20  7:30 AM    Specimen: Blood   Result Value Ref Range    ABO Type A     RH type Positive     Antibody Screen Negative     T&S Expiration Date 12/4/2020 11:59:59 PM    Respiratory Panel PCR w/COVID-19(SARS-CoV-2) [facility]NA[facility] In-House, NP Swab in [facility], 3-4 HR TAT - Swab, Nasopharynx    Collection Time: 12/01/20  7:30 AM    Specimen:  Nasopharynx; Swab   Result Value Ref Range    ADENOVIRUS, PCR Not Detected Not Detected    Coronavirus 229E Not Detected Not Detected    Coronavirus HKU1 Not Detected Not Detected    Coronavirus NL63 Not Detected Not Detected    Coronavirus OC43 Not Detected Not Detected    COVID19 Not Detected Not Detected - Ref. Range    Human Metapneumovirus Not Detected Not Detected    Human Rhinovirus/Enterovirus Not Detected Not Detected    Influenza A PCR Not Detected Not Detected    Influenza B PCR Not Detected Not Detected    Parainfluenza Virus 1 Not Detected Not Detected    Parainfluenza Virus 2 Not Detected Not Detected    Parainfluenza Virus 3 Not Detected Not Detected    Parainfluenza Virus 4 Not Detected Not Detected    RSV, PCR Not Detected Not Detected    Bordetella pertussis pcr Not Detected Not Detected    Bordetella parapertussis PCR Not Detected Not Detected    Chlamydophila pneumoniae PCR Not Detected Not Detected    Mycoplasma pneumo by PCR Not Detected Not Detected   Urinalysis With Microscopic If Indicated (No Culture) - Urine, Clean Catch    Collection Time: 12/01/20 10:25 AM    Specimen: Urine, Clean Catch   Result Value Ref Range    Color, UA Yellow Yellow, Straw    Appearance, UA Clear Clear    pH, UA 6.0 5.0 - 8.0    Specific Gravity, UA >=1.030 1.005 - 1.030    Glucose, UA Negative Negative    Ketones, UA Trace (A) Negative    Bilirubin, UA Negative Negative    Blood, UA Trace (A) Negative    Protein, UA 30 mg/dL (1+) (A) Negative    Leuk Esterase, UA Negative Negative    Nitrite, UA Negative Negative    Urobilinogen, UA 1.0 E.U./dL 0.2 - 1.0 E.U./dL   Urinalysis, Microscopic Only - Urine, Clean Catch    Collection Time: 12/01/20 10:25 AM    Specimen: Urine, Clean Catch   Result Value Ref Range    RBC, UA 3-5 (A) None Seen, 0-2 /HPF    WBC, UA 0-2 None Seen, 0-2 /HPF    Bacteria, UA None Seen None Seen /HPF    Squamous Epithelial Cells, UA 0-2 None Seen, 0-2 /HPF    Hyaline Casts, UA 0-2 None Seen /LPF     Methodology Automated Microscopy        Radiology  Ct Abdomen Pelvis With Contrast    Result Date: 12/1/2020  CT ABDOMEN AND PELVIS WITH IV CONTRAST  HISTORY: 65-year-old female with upper abdominal pain and hemoptysis. Alcohol abuse. Hepatitis C.  TECHNIQUE: Radiation dose reduction techniques were utilized, including automated exposure control and exposure modulation based on body size. 3 mm images were obtained through the abdomen and pelvis after the administration of IV contrast. Compared with previous CT from 04/29/2014.  FINDINGS: There is moderately extensive fatty infiltration of the liver and the liver appears enlarged. The gallbladder appears unremarkable and there is no biliary dilatation. Splenic size is normal. There is no change in the mild ectasia of the pancreatic duct. There is no evidence for acute or chronic pancreatitis. There is stable hyperplasia of the left adrenal gland. Right adrenal gland and kidneys appear unremarkable other than a single nonobstructing stone within the left kidney. The gastric body has a thickened edematous appearing wall. There is a paucity of formed stool within the colon. No colonic thickening is seen. The appendix appears normal. Uterus and left adnexa appear unremarkable. There is a 4.6 cm right ovarian cyst, previously measuring 3.8 cm. There is no free fluid or lymphadenopathy. There are mild-moderate abdominal aortic atherosclerotic changes without aneurysmal dilatation.      1. There is likely acute gastritis. 2. Moderately extensive hepatic steatosis. 3. Gynecology follow-up is recommended for the 4.6 cm right ovarian cyst.  Discussed with ELSA Machado.        Medical Decision Making:  ED Course as of Dec 01 1113   Tue Dec 01, 2020   0717 Ethanol(!): 22 [KA]   0717 Lipase: 46 [KA]   0717 Hemoglobin: 15.3 [KA]   0717 Creatinine(!): 1.46 [KA]   0718 BUN: 17 [KA]   0849 Lactate: 1.9 [KA]   0908 I discussed the patient with Dr. Duque, radiologist.  She  states the patient's stomach is thickened and edematous, suggestive of gastritis.  She also has significant fatty liver disease.  Additionally right ovarian cyst noted in 2014 to be 3.8 cm is now 4.6 cm and needs gynecology follow-up.    [KA]   4857 I reassessed the patient, she is resting comfortably.  We discussed her CT results including gastritis, fatty liver and right ovarian cyst that is enlarging and needs gynecologic follow-up.  She is agreeable with the plan for admission due to gastritis and hematemesis.    [KA]   3241 I discussed the patient with Dr. Victor, hospitalist for [facility].  We discussed presentation labs and imaging and he agrees to hospitalize the patient for observation to telemetry.    [KA]      ED Course User Index  [KA] Tali Quintero PA           PPE: Both the patient and I wore a surgical mask throughout the entire patient encounter. I wore protective goggles.     Diagnosis  Final diagnoses:   Acute alcoholic gastritis with hemorrhage   Hematemesis with nausea   Fatty liver   Right ovarian cyst        Leopoldo Penny II, MD  12/04/20 [id number]

## 2020-12-01 NOTE — ANESTHESIA POSTPROCEDURE EVALUATION
"Patient: Ronda Steve    Procedure Summary     Date: 12/01/20 Room / Location:  [facility] /  [facility]    Anesthesia Start: 1426 Anesthesia Stop: 1437    Procedure: ESOPHAGOGASTRODUODENOSCOPY (N/A Esophagus) Diagnosis:       Alcoholism /alcohol abuse (CMS/HCC)      (Alcoholism /alcohol abuse (CMS/HCC) [F10.20])    Surgeon: Jordan Iqbal MD Provider: Primo Galindo MD    Anesthesia Type: MAC ASA Status: 3          Anesthesia Type: MAC    Vitals  Vitals Value Taken Time   /92 12/01/20 1451   Temp     Pulse 94 12/01/20 1451   Resp 18 12/01/20 1451   SpO2 95 % 12/01/20 1451           Post Anesthesia Care and Evaluation    Patient location during evaluation: PACU  Patient participation: complete - patient participated  Level of consciousness: awake  Pain score: 0  Pain management: adequate  Airway patency: patent  Anesthetic complications: No anesthetic complications  PONV Status: none  Cardiovascular status: acceptable  Respiratory status: acceptable  Hydration status: acceptable    Comments: /92 (BP Location: Right arm, Patient Position: Lying)   Pulse 94   Temp 37.1 °C (98.8 °F)   Resp 18   Ht 157.5 cm (62\")   Wt 63.5 kg (140 lb)   SpO2 95%   BMI 25.61 kg/m²       "

## 2020-12-01 NOTE — ANESTHESIA PREPROCEDURE EVALUATION
Anesthesia Evaluation     Patient summary reviewed and Nursing notes reviewed                Airway   Mallampati: I  TM distance: >3 FB  Neck ROM: full  No difficulty expected  Dental - normal exam     Pulmonary - normal exam   (+) a smoker Current,   Cardiovascular - normal exam    (+) hypertension, hyperlipidemia,       Neuro/Psych  (+) psychiatric history Anxiety and Depression,     GI/Hepatic/Renal/Endo    (+)  GI bleeding , hepatitis C, liver disease, renal disease,     Musculoskeletal (-) negative ROS    Abdominal  - normal exam    Bowel sounds: normal.   Substance History - negative use     OB/GYN negative ob/gyn ROS         Other                        Anesthesia Plan    ASA 3     MAC       Anesthetic plan, all risks, benefits, and alternatives have been provided, discussed and informed consent has been obtained with: patient.

## 2020-12-01 NOTE — ED PROVIDER NOTES
EMERGENCY DEPARTMENT ENCOUNTER    Room Number:  E658/1  Date seen:  12/1/2020  Time seen: 08:00 EST  PCP: Provider, No Known  Historian: patient      HPI:  Chief Complaint: vomiting blood    A complete HPI/ROS/PMH/PSH/SH/FH are unobtainable due to: none    Context: Ronda Steve is a 65 y.o. female who presents to the ED for evaluation of of nausea vomiting blood and upper abdominal pain that began yesterday around 4 PM.  The pain is constant, the vomiting is intermittent though she reports 20+ episodes of bloody brown emesis without clots.  Nothing makes it worse or better.  She has a history of alcoholism, drinks alcohol every day.  No history of esophageal varices.  She also has a history which she states she followed up with Dr. Marti, liver specialist and was told that she no longer had it and that she did not need any treatment.  She states that she had GI bleeding in March of this year, was admitted to the hospital and believes she had an EGD at this time.  She states she had 1 episode of diarrhea yesterday, denies any hematuria dysuria fever chills or upper respiratory symptoms.  She is not anticoagulated.        PAST MEDICAL HISTORY  Active Ambulatory Problems     Diagnosis Date Noted   • Alcohol intoxication (CMS/HCC) 02/13/2020   • Alcoholism /alcohol abuse (CMS/HCC) 02/13/2020   • Gastrointestinal hemorrhage with hematemesis 02/13/2020   • Elevated BP without diagnosis of hypertension 02/14/2020   • Depression with anxiety 02/14/2020   • NSAID long-term use 02/14/2020   • Alcoholic hepatitis      Resolved Ambulatory Problems     Diagnosis Date Noted   • No Resolved Ambulatory Problems     Past Medical History:   Diagnosis Date   • Disease of thyroid gland    • Hepatitis C    • Hyperlipidemia          PAST SURGICAL HISTORY  Past Surgical History:   Procedure Laterality Date   • ENDOSCOPY N/A 2/14/2020    Procedure: ESOPHAGOGASTRODUODENOSCOPY;  Surgeon: Jordan Iqbal MD;  Location: [facility]  ENDOSCOPY;  Service: Gastroenterology;  Laterality: N/A;  PRE:  HEMATEMESIS  POST:  NORMAL EGD   • HIP ARTHROPLASTY      right   • TONSILLECTOMY           FAMILY HISTORY  Family History   Problem Relation Age of Onset   • Cancer Mother         breast         SOCIAL HISTORY  Social History     Socioeconomic History   • Marital status: Single     Spouse name: Not on file   • Number of children: Not on file   • Years of education: Not on file   • Highest education level: Not on file   Tobacco Use   • Smoking status: Current Every Day Smoker     Packs/day: 0.50     Years: 48.00     Pack years: 24.00   • Smokeless tobacco: Never Used   Substance and Sexual Activity   • Alcohol use: Yes     Alcohol/week: 7.0 - 8.0 standard drinks     Types: 4 Cans of beer, 3 - 4 Shots of liquor per week   • Drug use: Yes     Frequency: 2.0 times per week     Types: Marijuana   • Sexual activity: Defer         ALLERGIES  Patient has no known allergies.        REVIEW OF SYSTEMS  Review of Systems     All systems reviewed and negative except for those discussed in HPI.       PHYSICAL EXAM  ED Triage Vitals [12/01/20 0617]   Temp Heart Rate Resp BP SpO2   98.8 °F (37.1 °C) 89 16 (!) 174/104 95 %      Temp src Heart Rate Source Patient Position BP Location FiO2 (%)   -- -- -- -- --         GENERAL: not distressed  HENT: atraumatic   EYES: no scleral icterus  CV: regular rhythm, regular rate  RESPIRATORY: normal effort CTA B  ABDOMEN: softly distended, mild diffuse upper abdominal tenderness, no guarding or rigidity  MUSCULOSKELETAL: no deformity  NEURO: alert, moves all extremities, follows commands  SKIN: warm, dry    Vital signs and nursing notes reviewed.          LAB RESULTS  Recent Results (from the past 24 hour(s))   Comprehensive Metabolic Panel    Collection Time: 12/01/20  6:31 AM    Specimen: Blood   Result Value Ref Range    Glucose 111 (H) 65 - 99 mg/dL    BUN 17 8 - 23 mg/dL    Creatinine 1.46 (H) 0.57 - 1.00 mg/dL    Sodium 138  136 - 145 mmol/L    Potassium 3.8 3.5 - 5.2 mmol/L    Chloride 97 (L) 98 - 107 mmol/L    CO2 21.0 (L) 22.0 - 29.0 mmol/L    Calcium 9.5 8.6 - 10.5 mg/dL    Total Protein 7.6 6.0 - 8.5 g/dL    Albumin 4.80 3.50 - 5.20 g/dL    ALT (SGPT) 39 (H) 1 - 33 U/L    AST (SGOT) 36 (H) 1 - 32 U/L    Alkaline Phosphatase 157 (H) 39 - 117 U/L    Total Bilirubin 0.3 0.0 - 1.2 mg/dL    eGFR Non African Amer 36 (L) >60 mL/min/1.73    Globulin 2.8 gm/dL    A/G Ratio 1.7 g/dL    BUN/Creatinine Ratio 11.6 7.0 - 25.0    Anion Gap 20.0 (H) 5.0 - 15.0 mmol/L   Lipase    Collection Time: 12/01/20  6:31 AM    Specimen: Blood   Result Value Ref Range    Lipase 46 13 - 60 U/L   Ethanol    Collection Time: 12/01/20  6:31 AM    Specimen: Blood   Result Value Ref Range    Ethanol 22 (H) 0 - 10 mg/dL    Ethanol % 0.022 %   Light Blue Top    Collection Time: 12/01/20  6:31 AM   Result Value Ref Range    Extra Tube hold for add-on    Green Top (Gel)    Collection Time: 12/01/20  6:31 AM   Result Value Ref Range    Extra Tube Hold for add-ons.    Lavender Top    Collection Time: 12/01/20  6:31 AM   Result Value Ref Range    Extra Tube hold for add-on    Gold Top - SST    Collection Time: 12/01/20  6:31 AM   Result Value Ref Range    Extra Tube Hold for add-ons.    CBC Auto Differential    Collection Time: 12/01/20  6:31 AM    Specimen: Blood   Result Value Ref Range    WBC 12.71 (H) 3.40 - 10.80 10*3/mm3    RBC 4.54 3.77 - 5.28 10*6/mm3    Hemoglobin 15.3 12.0 - 15.9 g/dL    Hematocrit 44.7 34.0 - 46.6 %    MCV 98.5 (H) 79.0 - 97.0 fL    MCH 33.7 (H) 26.6 - 33.0 pg    MCHC 34.2 31.5 - 35.7 g/dL    RDW 13.0 12.3 - 15.4 %    RDW-SD 46.8 37.0 - 54.0 fl    MPV 8.9 6.0 - 12.0 fL    Platelets 332 140 - 450 10*3/mm3    Neutrophil % 79.2 (H) 42.7 - 76.0 %    Lymphocyte % 12.7 (L) 19.6 - 45.3 %    Monocyte % 7.1 5.0 - 12.0 %    Eosinophil % 0.1 (L) 0.3 - 6.2 %    Basophil % 0.2 0.0 - 1.5 %    Immature Grans % 0.7 (H) 0.0 - 0.5 %    Neutrophils, Absolute  10.07 (H) 1.70 - 7.00 10*3/mm3    Lymphocytes, Absolute 1.61 0.70 - 3.10 10*3/mm3    Monocytes, Absolute 0.90 0.10 - 0.90 10*3/mm3    Eosinophils, Absolute 0.01 0.00 - 0.40 10*3/mm3    Basophils, Absolute 0.03 0.00 - 0.20 10*3/mm3    Immature Grans, Absolute 0.09 (H) 0.00 - 0.05 10*3/mm3    nRBC 0.0 0.0 - 0.2 /100 WBC   Protime-INR    Collection Time: 12/01/20  6:31 AM    Specimen: Blood   Result Value Ref Range    Protime 12.1 11.7 - 14.2 Seconds    INR 0.91 0.90 - 1.10   aPTT    Collection Time: 12/01/20  6:31 AM    Specimen: Blood   Result Value Ref Range    PTT 28.5 22.7 - 35.4 seconds   TSH    Collection Time: 12/01/20  6:31 AM    Specimen: Blood   Result Value Ref Range    TSH 3.380 0.270 - 4.200 uIU/mL   Lactic Acid, Plasma    Collection Time: 12/01/20  7:30 AM    Specimen: Blood   Result Value Ref Range    Lactate 1.9 0.5 - 2.0 mmol/L   Type & Screen    Collection Time: 12/01/20  7:30 AM    Specimen: Blood   Result Value Ref Range    ABO Type A     RH type Positive     Antibody Screen Negative     T&S Expiration Date 12/4/2020 11:59:59 PM    Respiratory Panel PCR w/COVID-19(SARS-CoV-2) ISHAAN/NIKHIL/KODAK/PAD/COR/MAD/DANAY In-House, NP Swab in [facility], 3-4 HR TAT - Swab, Nasopharynx    Collection Time: 12/01/20  7:30 AM    Specimen: Nasopharynx; Swab   Result Value Ref Range    ADENOVIRUS, PCR Not Detected Not Detected    Coronavirus 229E Not Detected Not Detected    Coronavirus HKU1 Not Detected Not Detected    Coronavirus NL63 Not Detected Not Detected    Coronavirus OC43 Not Detected Not Detected    COVID19 Not Detected Not Detected - Ref. Range    Human Metapneumovirus Not Detected Not Detected    Human Rhinovirus/Enterovirus Not Detected Not Detected    Influenza A PCR Not Detected Not Detected    Influenza B PCR Not Detected Not Detected    Parainfluenza Virus 1 Not Detected Not Detected    Parainfluenza Virus 2 Not Detected Not Detected    Parainfluenza Virus 3 Not Detected Not Detected    Parainfluenza Virus 4  Not Detected Not Detected    RSV, PCR Not Detected Not Detected    Bordetella pertussis pcr Not Detected Not Detected    Bordetella parapertussis PCR Not Detected Not Detected    Chlamydophila pneumoniae PCR Not Detected Not Detected    Mycoplasma pneumo by PCR Not Detected Not Detected   Urinalysis With Microscopic If Indicated (No Culture) - Urine, Clean Catch    Collection Time: 12/01/20 10:25 AM    Specimen: Urine, Clean Catch   Result Value Ref Range    Color, UA Yellow Yellow, Straw    Appearance, UA Clear Clear    pH, UA 6.0 5.0 - 8.0    Specific Gravity, UA >=1.030 1.005 - 1.030    Glucose, UA Negative Negative    Ketones, UA Trace (A) Negative    Bilirubin, UA Negative Negative    Blood, UA Trace (A) Negative    Protein, UA 30 mg/dL (1+) (A) Negative    Leuk Esterase, UA Negative Negative    Nitrite, UA Negative Negative    Urobilinogen, UA 1.0 E.U./dL 0.2 - 1.0 E.U./dL   Urinalysis, Microscopic Only - Urine, Clean Catch    Collection Time: 12/01/20 10:25 AM    Specimen: Urine, Clean Catch   Result Value Ref Range    RBC, UA 3-5 (A) None Seen, 0-2 /HPF    WBC, UA 0-2 None Seen, 0-2 /HPF    Bacteria, UA None Seen None Seen /HPF    Squamous Epithelial Cells, UA 0-2 None Seen, 0-2 /HPF    Hyaline Casts, UA 0-2 None Seen /LPF    Methodology Automated Microscopy        Ordered the above labs and independently reviewed the results.        RADIOLOGY  CT Abdomen Pelvis With Contrast   Final Result   1. There is likely acute gastritis.   2. Moderately extensive hepatic steatosis.   3. Gynecology follow-up is recommended for the 4.6 cm right ovarian   cyst.       Discussed with ELSA Machado.       This report was finalized on 12/1/2020 1:51 PM by Dr. Madiha Duque M.D.              I ordered the above noted radiological studies. Reviewed by me and discussed with radiologist.  See dictation for official radiology interpretation.    PROCEDURES  Procedures        MEDICATIONS GIVEN IN ER  Medications   sodium  chloride 0.9 % flush 10 mL (has no administration in time range)   sodium chloride 0.9 % flush 10 mL (10 mL Intravenous Given 12/1/20 1145)   sodium chloride 0.9 % flush 10 mL (has no administration in time range)   acetaminophen (TYLENOL) tablet 650 mg (650 mg Oral Given 12/1/20 1144)   ondansetron (ZOFRAN) tablet 4 mg (4 mg Oral Given 12/1/20 1144)   labetalol (NORMODYNE,TRANDATE) injection 10 mg (10 mg Intravenous Not Given 12/1/20 1527)   labetalol (NORMODYNE,TRANDATE) injection 10 mg (has no administration in time range)   lactated ringers infusion ( Intravenous Currently Infusing 12/1/20 1436)   LORazepam (ATIVAN) tablet 1 mg (has no administration in time range)     Or   LORazepam (ATIVAN) injection 1 mg (has no administration in time range)     Or   LORazepam (ATIVAN) tablet 2 mg (has no administration in time range)     Or   LORazepam (ATIVAN) injection 2 mg (has no administration in time range)     Or   LORazepam (ATIVAN) injection 2 mg (has no administration in time range)     Or   LORazepam (ATIVAN) injection 2 mg (has no administration in time range)   cloNIDine (CATAPRES) tablet 0.1 mg (has no administration in time range)   thiamine (VITAMIN B-1) tablet 100 mg (has no administration in time range)     And   multivitamin (THERAGRAN) tablet 1 tablet (has no administration in time range)     And   folic acid (FOLVITE) tablet 1 mg (has no administration in time range)   sodium chloride 0.9 % infusion (50 mL/hr Intravenous New Bag 12/1/20 1548)   sucralfate (CARAFATE) tablet 1 g (has no administration in time range)   influenza vac split quad (FLUZONE,FLUARIX,AFLURIA,FLULAVAL) injection 0.5 mL (has no administration in time range)   pantoprazole (PROTONIX) EC tablet 40 mg (40 mg Oral Given 12/1/20 1712)   ondansetron (ZOFRAN) injection 4 mg (4 mg Intravenous Given 12/1/20 0734)   sodium chloride 0.9 % bolus 1,000 mL (0 mL Intravenous Stopped 12/1/20 1053)   pantoprazole (PROTONIX) injection 80 mg (80 mg  Intravenous Given 12/1/20 0820)   ondansetron (ZOFRAN) injection 4 mg (4 mg Intravenous Given 12/1/20 0821)   HYDROmorphone (DILAUDID) injection 0.5 mg (0.5 mg Intravenous Given 12/1/20 0821)   iopamidol (ISOVUE-300) 61 % injection 100 mL (100 mL Intravenous Given by Other 12/1/20 0837)   thiamine (B-1) 100 mg in sodium chloride 0.9 % 100 mL IVPB (100 mg Intravenous New Bag 12/1/20 1712)     Or   thiamine (VITAMIN B-1) tablet 100 mg ( Oral Not Given:  See Alt 12/1/20 1712)             PROGRESS AND CONSULTS    Differential diagnosis includes but is not limited to:  - hepatobiliary pathology such as cholecystitis, cholangitis, and symptomatic cholelithiasis  - Pancreatitis  - Dyspepsia  - Small bowel obstruction  - Appendicitis  - Diverticulitis  - UTI including pyelonephritis  - Ureteral stone  - Zoster  - Colitis, including infectious and ischemic  - Atypical ACS      ED Course as of Dec 01 1757   Tue Dec 01, 2020   0717 Ethanol(!): 22 [KA]   0717 Lipase: 46 [KA]   0717 Hemoglobin: 15.3 [KA]   0717 Creatinine(!): 1.46 [KA]   0718 BUN: 17 [KA]   0849 Lactate: 1.9 [KA]   0908 I discussed the patient with Dr. Duque, radiologist.  She states the patient's stomach is thickened and edematous, suggestive of gastritis.  She also has significant fatty liver disease.  Additionally right ovarian cyst noted in 2014 to be 3.8 cm is now 4.6 cm and needs gynecology follow-up.    [KA]   0933 I reassessed the patient, she is resting comfortably.  We discussed her CT results including gastritis, fatty liver and right ovarian cyst that is enlarging and needs gynecologic follow-up.  She is agreeable with the plan for admission due to gastritis and hematemesis.    [KA]   0953 I discussed the patient with Dr. Victor, hospitalist for A.  We discussed presentation labs and imaging and he agrees to hospitalize the patient for observation to telemetry.    [KA]      ED Course User Index  [KA] Tali Quintero PA     We did page  gastroenterology multiple times without a call back.       Reviewed pt's history and workup with Dr. Penny.  After a bedside evaluation; they agree with the plan of care      Patient was placed in face mask in first look. Patient was wearing facemask each time I entered the room and throughout our encounter. I wore protective equipment throughout this patient encounter including a face mask, eye shield and gloves. Hand hygiene was performed before donning protective equipment and after removal when leaving the room.        DIAGNOSIS  Final diagnoses:   Acute alcoholic gastritis with hemorrhage   Hematemesis with nausea   Fatty liver   Right ovarian cyst               Latest Documented Vital Signs:  As of 17:57 EST  BP- 165/95 HR- 98 Temp- 98.7 °F (37.1 °C) (Oral) O2 sat- 98%       Tali Quintero PA  12/01/20 6593

## 2020-12-01 NOTE — CONSULTS
[facility]  Initial Inpatient Consult Note    Referring Provider: Dr. Chambers    Reason for Consultation: GI bleed    Subjective     History of present illness:    65 y.o. female, alcohol abuse daily, last drink last night, admitted with hematemesis x10 episodes.  She was admitted back in February with the same.  EGD at that time was within normal limits.  She denies melena or bright red blood per rectum.  She had epigastric pain yesterday but it is resolved.  It was colicky in nature did not radiate.  It was worse after eating better after vomiting.  She is admitted today intoxicated.  She occasionally takes NSAIDs.  She has a history of hepatitis C but has cleared the infection on her home per Dr. Marti I have none of these records to review.  She smokes 1/2 pack of cigarettes a day, colonoscopy she says 8 years ago was within normal limits.  He has no documented history of cirrhosis.  No history of ascites.  No confusion, memory loss or other evidence of encephalopathy      Past Medical History:  Past Medical History:   Diagnosis Date   • Alcoholic hepatitis    • Disease of thyroid gland    • Hepatitis C    • Hyperlipidemia      Past Surgical History:  Past Surgical History:   Procedure Laterality Date   • ENDOSCOPY N/A 2/14/2020    Procedure: ESOPHAGOGASTRODUODENOSCOPY;  Surgeon: Jordan Iqbal MD;  Location: [facility] ENDOSCOPY;  Service: Gastroenterology;  Laterality: N/A;  PRE:  HEMATEMESIS  POST:  NORMAL EGD   • HIP ARTHROPLASTY      right   • TONSILLECTOMY        Social History:   Social History     Tobacco Use   • Smoking status: Current Every Day Smoker     Packs/day: 0.50     Years: 48.00     Pack years: 24.00   • Smokeless tobacco: Never Used   Substance Use Topics   • Alcohol use: Yes     Alcohol/week: 36.0 standard drinks     Types: 35 Cans of beer, 1 Shots of liquor per week      Family History:  Family History   Problem Relation Age of Onset   • Cancer Mother          breast       Home Meds:  (Not in a hospital admission)    Current Meds:   sodium chloride, 10 mL, Intravenous, Q12H      Allergies:  No Known Allergies  Review of Systems  She has weakness and fatigue all other systems reviewed and negative     Objective     Vital Signs  Temp:  [98.8 °F (37.1 °C)] 98.8 °F (37.1 °C)  Heart Rate:  [83-89] 87  Resp:  [16] 16  BP: (174-182)/() 180/96  Physical Exam:  General Appearance:    Alert, cooperative, in no acute distress   Head:    Normocephalic, without obvious abnormality, atraumatic   Eyes:          conjunctivae and sclerae normal, no   icterus   Throat:   no thrush, oral mucosa moist   Neck:   Supple, no adenopathy   Lungs:     Clear to auscultation bilaterally    Heart:    Regular rhythm and normal rate    Chest Wall:    No abnormalities observed   Abdomen:     Soft, nondistended, nontender; normal bowel sounds   Extremities:   no edema, no redness   Skin:   No bruising or rash   Psychiatric:  normal mood and insight     Results Review:   I reviewed the patient's new clinical results.    Results from last 7 days   Lab Units 12/01/20  0631   WBC 10*3/mm3 12.71*   HEMOGLOBIN g/dL 15.3   HEMATOCRIT % 44.7   PLATELETS 10*3/mm3 332     Results from last 7 days   Lab Units 12/01/20  0631   SODIUM mmol/L 138   POTASSIUM mmol/L 3.8   CHLORIDE mmol/L 97*   CO2 mmol/L 21.0*   BUN mg/dL 17   CREATININE mg/dL 1.46*   CALCIUM mg/dL 9.5   BILIRUBIN mg/dL 0.3   ALK PHOS U/L 157*   ALT (SGPT) U/L 39*   AST (SGOT) U/L 36*   GLUCOSE mg/dL 111*     Results from last 7 days   Lab Units 12/01/20  0631   INR  0.91     Lab Results   Lab Value Date/Time    LIPASE 46 12/01/2020 0631    LIPASE 19 04/30/2014 0437    LIPASE 28 04/28/2014 1100       Radiology:  CT Abdomen Pelvis With Contrast   Preliminary Result   1. There is likely acute gastritis.   2. Moderately extensive hepatic steatosis.   3. Gynecology follow-up is recommended for the 4.6 cm right ovarian   cyst.       Discussed with  ELSA Machado.              Assessment/Plan   Patient Active Problem List   Diagnosis   • Hepatitis C   • Alcohol intoxication (CMS/HCC)   • Alcoholism /alcohol abuse (CMS/HCC)   • Gastrointestinal hemorrhage with hematemesis   • Elevated BP without diagnosis of hypertension   • Depression with anxiety   • NSAID long-term use   • Acute alcoholic gastritis with hemorrhage       Assessment:  1. Alcohol intoxication  2. Hematemesis  3. Abnormal imaging with gastritis was peptic ulcer disease  4. Epigastric pain, resolving  5. Elevated liver test    Plan:  · Plan for EGD today  · IV Protonix for now  · Serial hemoglobin  · N.p.o.  · Alcohol abstinence will be required lifelong      I discussed the patients findings and my recommendations with patient and nursing staff.    Jordan Iqbal MD

## 2020-12-01 NOTE — PLAN OF CARE
Problem: Adult Inpatient Plan of Care  Goal: Plan of Care Review  Outcome: Ongoing, Progressing  Flowsheets  Taken 12/1/2020 1559 by Emma Klein, RN  Progress: no change  Outcome Summary: Patient alert and oriented. Admitted to the unit for ER. Patient went for EGD from ER. EGD discovered acute gastritis. IV fluids infusing. CIWA protocol. Seizure precautions in place. VSS. No acute distress noted. Nursing will continue to monitor.  Taken 12/1/2020 1434 by Mia John, RN  Plan of Care Reviewed With: patient   Goal Outcome Evaluation:           - Continue Keppra 1 g BID.

## 2020-12-01 NOTE — ED NOTES
"Pt via EMS from home with c/o vomiting blood after drinking 4-5 beers and about 3 \"vodka drinks\".   Hx of depression, alcohol abuse  Mask placed on pt, staff wearing appropriate PPE.            Mone Flores RN  12/01/20 9557    "

## 2020-12-01 NOTE — ED NOTES
Pt c/o vomiting blood x 1600 yesterday, approx 20times pt states emesis is reddish brown, denies any blood in stool. C/o upper abd pain . Pt admits to ETOH her whole life last drink was yesterday 1800.      Keke Hampton, RN  12/01/20 0683

## 2020-12-02 VITALS
TEMPERATURE: 98.8 F | HEART RATE: 72 BPM | RESPIRATION RATE: 18 BRPM | BODY MASS INDEX: 25.82 KG/M2 | DIASTOLIC BLOOD PRESSURE: 93 MMHG | OXYGEN SATURATION: 95 % | SYSTOLIC BLOOD PRESSURE: 180 MMHG | HEIGHT: 62 IN | WEIGHT: 140.3 LBS

## 2020-12-02 PROBLEM — K29.61: Status: ACTIVE | Noted: 2020-12-02

## 2020-12-02 LAB
ALBUMIN SERPL-MCNC: 3.6 G/DL (ref 3.5–5.2)
ALBUMIN/GLOB SERPL: 1.6 G/DL
ALP SERPL-CCNC: 107 U/L (ref 39–117)
ALT SERPL W P-5'-P-CCNC: 24 U/L (ref 1–33)
ANION GAP SERPL CALCULATED.3IONS-SCNC: 11.3 MMOL/L (ref 5–15)
AST SERPL-CCNC: 28 U/L (ref 1–32)
BASOPHILS # BLD AUTO: 0.03 10*3/MM3 (ref 0–0.2)
BASOPHILS NFR BLD AUTO: 0.4 % (ref 0–1.5)
BILIRUB SERPL-MCNC: 0.3 MG/DL (ref 0–1.2)
BUN SERPL-MCNC: 13 MG/DL (ref 8–23)
BUN/CREAT SERPL: 17.3 (ref 7–25)
CALCIUM SPEC-SCNC: 8.5 MG/DL (ref 8.6–10.5)
CHLORIDE SERPL-SCNC: 104 MMOL/L (ref 98–107)
CO2 SERPL-SCNC: 23.7 MMOL/L (ref 22–29)
CREAT SERPL-MCNC: 0.75 MG/DL (ref 0.57–1)
DEPRECATED RDW RBC AUTO: 47 FL (ref 37–54)
EOSINOPHIL # BLD AUTO: 0.11 10*3/MM3 (ref 0–0.4)
EOSINOPHIL NFR BLD AUTO: 1.5 % (ref 0.3–6.2)
ERYTHROCYTE [DISTWIDTH] IN BLOOD BY AUTOMATED COUNT: 13 % (ref 12.3–15.4)
FOLATE SERPL-MCNC: 18 NG/ML (ref 4.78–24.2)
GFR SERPL CREATININE-BSD FRML MDRD: 78 ML/MIN/1.73
GLOBULIN UR ELPH-MCNC: 2.2 GM/DL
GLUCOSE SERPL-MCNC: 79 MG/DL (ref 65–99)
HCT VFR BLD AUTO: 36.8 % (ref 34–46.6)
HGB BLD-MCNC: 12.7 G/DL (ref 12–15.9)
IMM GRANULOCYTES # BLD AUTO: 0.03 10*3/MM3 (ref 0–0.05)
IMM GRANULOCYTES NFR BLD AUTO: 0.4 % (ref 0–0.5)
INR PPP: 0.92 (ref 0.9–1.1)
LYMPHOCYTES # BLD AUTO: 1.83 10*3/MM3 (ref 0.7–3.1)
LYMPHOCYTES NFR BLD AUTO: 24.5 % (ref 19.6–45.3)
MCH RBC QN AUTO: 34.3 PG (ref 26.6–33)
MCHC RBC AUTO-ENTMCNC: 34.5 G/DL (ref 31.5–35.7)
MCV RBC AUTO: 99.5 FL (ref 79–97)
MONOCYTES # BLD AUTO: 0.73 10*3/MM3 (ref 0.1–0.9)
MONOCYTES NFR BLD AUTO: 9.8 % (ref 5–12)
NEUTROPHILS NFR BLD AUTO: 4.75 10*3/MM3 (ref 1.7–7)
NEUTROPHILS NFR BLD AUTO: 63.4 % (ref 42.7–76)
NRBC BLD AUTO-RTO: 0 /100 WBC (ref 0–0.2)
PLATELET # BLD AUTO: 219 10*3/MM3 (ref 140–450)
PMV BLD AUTO: 8.8 FL (ref 6–12)
POTASSIUM SERPL-SCNC: 3.3 MMOL/L (ref 3.5–5.2)
PROT SERPL-MCNC: 5.8 G/DL (ref 6–8.5)
PROTHROMBIN TIME: 12.2 SECONDS (ref 11.7–14.2)
RBC # BLD AUTO: 3.7 10*6/MM3 (ref 3.77–5.28)
SODIUM SERPL-SCNC: 139 MMOL/L (ref 136–145)
VIT B12 BLD-MCNC: 413 PG/ML (ref 211–946)
WBC # BLD AUTO: 7.48 10*3/MM3 (ref 3.4–10.8)

## 2020-12-02 PROCEDURE — 90686 IIV4 VACC NO PRSV 0.5 ML IM: CPT | Performed by: HOSPITALIST

## 2020-12-02 PROCEDURE — 85610 PROTHROMBIN TIME: CPT | Performed by: INTERNAL MEDICINE

## 2020-12-02 PROCEDURE — G0378 HOSPITAL OBSERVATION PER HR: HCPCS

## 2020-12-02 PROCEDURE — 82746 ASSAY OF FOLIC ACID SERUM: CPT | Performed by: NURSE PRACTITIONER

## 2020-12-02 PROCEDURE — 36415 COLL VENOUS BLD VENIPUNCTURE: CPT | Performed by: INTERNAL MEDICINE

## 2020-12-02 PROCEDURE — 80053 COMPREHEN METABOLIC PANEL: CPT | Performed by: INTERNAL MEDICINE

## 2020-12-02 PROCEDURE — 82607 VITAMIN B-12: CPT | Performed by: NURSE PRACTITIONER

## 2020-12-02 PROCEDURE — 85025 COMPLETE CBC W/AUTO DIFF WBC: CPT | Performed by: INTERNAL MEDICINE

## 2020-12-02 PROCEDURE — G0008 ADMIN INFLUENZA VIRUS VAC: HCPCS | Performed by: HOSPITALIST

## 2020-12-02 PROCEDURE — 25010000002 INFLUENZA VAC SPLIT QUAD 0.5 ML SUSPENSION PREFILLED SYRINGE: Performed by: HOSPITALIST

## 2020-12-02 RX ORDER — GABAPENTIN 300 MG/1
300 CAPSULE ORAL 3 TIMES DAILY
Status: DISCONTINUED | OUTPATIENT
Start: 2020-12-02 | End: 2020-12-02 | Stop reason: HOSPADM

## 2020-12-02 RX ORDER — ESCITALOPRAM OXALATE 20 MG/1
20 TABLET ORAL DAILY
Status: DISCONTINUED | OUTPATIENT
Start: 2020-12-02 | End: 2020-12-02 | Stop reason: HOSPADM

## 2020-12-02 RX ORDER — POTASSIUM CHLORIDE 750 MG/1
40 TABLET, FILM COATED, EXTENDED RELEASE ORAL AS NEEDED
Status: DISCONTINUED | OUTPATIENT
Start: 2020-12-02 | End: 2020-12-02 | Stop reason: HOSPADM

## 2020-12-02 RX ORDER — POTASSIUM CHLORIDE 1.5 G/1.77G
40 POWDER, FOR SOLUTION ORAL AS NEEDED
Status: DISCONTINUED | OUTPATIENT
Start: 2020-12-02 | End: 2020-12-02 | Stop reason: HOSPADM

## 2020-12-02 RX ORDER — LISINOPRIL 10 MG/1
10 TABLET ORAL
Status: DISCONTINUED | OUTPATIENT
Start: 2020-12-02 | End: 2020-12-02 | Stop reason: HOSPADM

## 2020-12-02 RX ORDER — BUPROPION HYDROCHLORIDE 300 MG/1
300 TABLET ORAL DAILY
Status: DISCONTINUED | OUTPATIENT
Start: 2020-12-02 | End: 2020-12-02 | Stop reason: HOSPADM

## 2020-12-02 RX ORDER — NIFEDIPINE 30 MG/1
30 TABLET, EXTENDED RELEASE ORAL
Status: DISCONTINUED | OUTPATIENT
Start: 2020-12-02 | End: 2020-12-02 | Stop reason: HOSPADM

## 2020-12-02 RX ORDER — SUCRALFATE 1 G/1
1 TABLET ORAL 4 TIMES DAILY
Qty: 112 TABLET | Refills: 0 | Status: SHIPPED | OUTPATIENT
Start: 2020-12-02 | End: 2020-12-30

## 2020-12-02 RX ORDER — PANTOPRAZOLE SODIUM 40 MG/1
40 TABLET, DELAYED RELEASE ORAL
Qty: 60 TABLET | Refills: 0 | Status: SHIPPED | OUTPATIENT
Start: 2020-12-02 | End: 2021-03-01 | Stop reason: SDUPTHER

## 2020-12-02 RX ADMIN — SUCRALFATE 1 G: 1 TABLET ORAL at 07:28

## 2020-12-02 RX ADMIN — BUPROPION HYDROCHLORIDE 300 MG: 300 TABLET, EXTENDED RELEASE ORAL at 10:26

## 2020-12-02 RX ADMIN — INFLUENZA VIRUS VACCINE 0.5 ML: 15; 15; 15; 15 SUSPENSION INTRAMUSCULAR at 10:58

## 2020-12-02 RX ADMIN — LISINOPRIL 10 MG: 10 TABLET ORAL at 10:26

## 2020-12-02 RX ADMIN — Medication 1 TABLET: at 10:26

## 2020-12-02 RX ADMIN — Medication 100 MG: at 10:26

## 2020-12-02 RX ADMIN — PANTOPRAZOLE SODIUM 40 MG: 40 TABLET, DELAYED RELEASE ORAL at 07:28

## 2020-12-02 RX ADMIN — ESCITALOPRAM 20 MG: 20 TABLET, FILM COATED ORAL at 10:26

## 2020-12-02 RX ADMIN — NIFEDIPINE 30 MG: 30 TABLET, FILM COATED, EXTENDED RELEASE ORAL at 10:26

## 2020-12-02 RX ADMIN — POTASSIUM CHLORIDE 40 MEQ: 750 TABLET, EXTENDED RELEASE ORAL at 10:58

## 2020-12-02 RX ADMIN — GABAPENTIN 300 MG: 300 CAPSULE ORAL at 10:26

## 2020-12-02 RX ADMIN — FOLIC ACID 1 MG: 1 TABLET ORAL at 10:26

## 2020-12-02 NOTE — PLAN OF CARE
Goal Outcome Evaluation:  Plan of Care Reviewed With: patient  Progress: improving  Outcome Summary: Patient  resting at this time. Alert  and  oriented.  Refused  bed  alarm.  IV  fluids  ongoing.   Remain on CIWA  protocol.      Blood  Pressure     elevated.  labetalol   x 1.  Tylenol  x  1 for   leg  pain.  SR on  monitor. Nursing  will  continue to monitor.

## 2020-12-02 NOTE — DISCHARGE SUMMARY
[facility] Medicine Services  DISCHARGE SUMMARY    Patient Name: Ronda Steve  : 1955  MRN: [id number]    Date of Admission: 2020  6:18 AM  Date of Discharge:  2020  Primary Care Physician: Provider, No Known    Consults     Date and Time Order Name Status Description    2020 0941 Gastroenterology (on-call MD unless specified) Completed     2020 0947 [facility] (on-call MD unless specified) Details Completed           Hospital Course     Presenting Problem:   Fatty liver [K76.0]  Alcoholism /alcohol abuse (CMS/HCC) [F10.20]  Right ovarian cyst [N83.201]  Hematemesis with nausea [K92.0]  Acute alcoholic gastritis with hemorrhage [K29.21]    Active Hospital Problems    Diagnosis  POA   • **Hemorrhage of stomach due to erosive gastritis [K29.61]  Yes   • Essential hypertension [I10]  Yes   • History of hepatitis C [Z86.19]  Yes   • DEBI (acute kidney injury) (CMS/HCC) [N17.9]  Yes   • Hepatic steatosis [K76.0]  Yes   • Elevated LFTs [R79.89]  Unknown   • Cyst of right ovary [N83.201]  Yes   • Metabolic acidosis, increased anion gap [E87.2]  Unknown   • Depression with anxiety [F41.8]  Yes   • Alcoholism /alcohol abuse (CMS/HCC) [F10.20]  Yes   • Alcohol intoxication (CMS/HCC) [F10.929]  Yes   • Gastrointestinal hemorrhage with hematemesis [K92.0]  Yes      Resolved Hospital Problems   No resolved problems to display.      History of present illness as written by the nurse practitioner 1 patient was initially hospitalized into observation status on 2020  Ms. Steve is a 65 y.o. smoker with a history of alcohol abuse, gastritis, GI bleed, resolved hepatitis c, untreated hypertension that presents to [facility] complaining of nausea and vomiting with hematemesis.  Onset of symptoms yesterday and she has vomited approximately 20 times since onset.  Associated symptoms includes epigastric pain and lower abdominal pain.  Abdominal pain is described as an ache.  No  "obvious alleviating or exacerbating factors.  She denies melena or hematochezia.  Patient admitted for GI bleed last February with EGD demonstrating gastritis. She reports a history of hepatitis c but was told by a doctor it \"went away.\"  No prior diagnosis of cirrhosis but has hepatic steatosis on imaging.  She is a chronic alcoholic.  Last drink yesterday which she consumed 3 beers and 3 vodka drinks.  She is somewhat elusive about her alcohol consumption.  She denies a history of alcohol withdrawal symptoms or EtOH related seizures. She smokes 1/2 pack of cigarettes per day.  She reports she is to use NSAIDs heavily but now only takes Tylenol.             Hospital Course:  Ronda Steve is a 65 y.o. female received EGD and was found to have acute alcoholic gastritis with hemorrhage.  Gastroenterologist is testing her for H. pylori and will follow her up in clinic.  They recommended 6 months of PPI therapy.  Additionally they recommended 4 weeks of Carafate therapy.  Patient stayed the night in the hospital and was feeling much better this morning and is tolerating her diet.  She is requesting discharge home.  She agrees with plan for sobriety.  She has provided resources from case management team and has plans to achieve her sobriety goals.  She appears hemodynamically to stable to discharge and will need to follow-up with primary care and gastroenterology.  Also patient incidentally had CT imaging finding of enlarged ovarian cyst.  They recommended gynecology follow-up.  I discussed findings with patient and its significance and she agrees to follow-up.  I placed a referral and she will call to confirm.      At the time of discharge patient was told to take all medications as prescribed, keep all follow-up appointments, and call their doctor or return to the hospital with any worsening or concerning symptoms.    Please note that dragon voice recognition software was used to create this note and that " transcription errors are possible.      Day of Discharge     HPI:   Feels well.  Stomach pain has resolved.  Eating well.  No other new complaints.  Wants discharge today.    ROS:  No current fevers or chills  No current shortness of breath or cough  No current nausea, vomiting, or diarrhea  No current chest pain or palpitations      Vital Signs:   Temp:  [98.3 °F (36.8 °C)-99.2 °F (37.3 °C)] 98.8 °F (37.1 °C)  Heart Rate:  [72-98] 72  Resp:  [18] 18  BP: (159-192)/() 180/93     Physical Exam:  Constitutional:Awake, alert  HENT: NCAT, mucous membranes moist, neck supple  Respiratory: Clear to auscultation bilaterally, respiratory effort normal, nonlabored breathing   Cardiovascular: RRR, normal radial pulses  Gastrointestinal: Positive bowel sounds, soft, nontender, nondistended  Musculoskeletal: Normal musculature for age, no lower extremity edema, BMI 25  Psychiatric: Appropriate affect, cooperative, conversational  Neurologic: No slurred speech or facial droop, follows commands  Skin: No rashes or jaundice, warm      Pertinent  and/or Most Recent Results     Results from last 7 days   Lab Units 12/02/20  0157 12/01/20  1811 12/01/20  0631   WBC 10*3/mm3 7.48  --  12.71*   HEMOGLOBIN g/dL 12.7 13.3 15.3   HEMATOCRIT % 36.8 40.6 44.7   PLATELETS 10*3/mm3 219  --  332   SODIUM mmol/L 139  --  138   POTASSIUM mmol/L 3.3*  --  3.8   CHLORIDE mmol/L 104  --  97*   CO2 mmol/L 23.7  --  21.0*   BUN mg/dL 13  --  17   CREATININE mg/dL 0.75  --  1.46*   GLUCOSE mg/dL 79  --  111*   CALCIUM mg/dL 8.5*  --  9.5     Results from last 7 days   Lab Units 12/02/20  0157 12/01/20  0631   BILIRUBIN mg/dL 0.3 0.3   ALK PHOS U/L 107 157*   ALT (SGPT) U/L 24 39*   AST (SGOT) U/L 28 36*   PROTIME Seconds 12.2 12.1   INR  0.92 0.91   APTT seconds  --  28.5           Invalid input(s): TG, LDLCALC, LDLREALC  Results from last 7 days   Lab Units 12/01/20  0730 12/01/20  0631   TSH uIU/mL  --  3.380   LACTATE mmol/L 1.9  --         Brief Urine Lab Results  (Last result in the past 365 days)      Color   Clarity   Blood   Leuk Est   Nitrite   Protein   CREAT   Urine HCG        12/01/20 1025 Yellow Clear Trace Negative Negative 30 mg/dL (1+)               Microbiology Results Abnormal     Procedure Component Value - Date/Time    COVID PRE-OP / PRE-PROCEDURE SCREENING ORDER (NO ISOLATION) - Swab, Nasopharynx [[id number]]  (Normal) Collected: 12/01/20 0730    Lab Status: Final result Specimen: Swab from Nasopharynx Updated: 12/01/20 1048    Narrative:      The following orders were created for panel order COVID PRE-OP / PRE-PROCEDURE SCREENING ORDER (NO ISOLATION) - Swab, Nasopharynx.  Procedure                               Abnormality         Status                     ---------                               -----------         ------                     Respiratory Panel PCR w/...[[id number]]  Normal              Final result                 Please view results for these tests on the individual orders.    Respiratory Panel PCR w/COVID-19(SARS-CoV-2) ISHAAN/NIKHIL/KODAK/PAD/COR/MAD/DANAY In-House, NP Swab in UTM/VTM, 3-4 HR TAT - Swab, Nasopharynx [[id number]]  (Normal) Collected: 12/01/20 0730    Lab Status: Final result Specimen: Swab from Nasopharynx Updated: 12/01/20 1048     ADENOVIRUS, PCR Not Detected     Coronavirus 229E Not Detected     Coronavirus HKU1 Not Detected     Coronavirus NL63 Not Detected     Coronavirus OC43 Not Detected     COVID19 Not Detected     Human Metapneumovirus Not Detected     Human Rhinovirus/Enterovirus Not Detected     Influenza A PCR Not Detected     Influenza B PCR Not Detected     Parainfluenza Virus 1 Not Detected     Parainfluenza Virus 2 Not Detected     Parainfluenza Virus 3 Not Detected     Parainfluenza Virus 4 Not Detected     RSV, PCR Not Detected     Bordetella pertussis pcr Not Detected     Bordetella parapertussis PCR Not Detected     Chlamydophila pneumoniae PCR Not Detected     Mycoplasma pneumo by PCR  Not Detected    Narrative:      Fact sheet for providers: https://docs.[employer]/wp-content/uploads/QWE3811-5182-ME0.1-EUA-Provider-Fact-Sheet-3.pdf    Fact sheet for patients: https://docs.[employer]/wp-content/uploads/NFK9467-7216-RV8.1-EUA-Patient-Fact-Sheet-1.pdf          Imaging Results (All)     Procedure Component Value Units Date/Time    CT Abdomen Pelvis With Contrast [[id number]] Collected: 12/01/20 0927     Updated: 12/01/20 4946    Narrative:      CT ABDOMEN AND PELVIS WITH IV CONTRAST     HISTORY: 65-year-old female with upper abdominal pain and hemoptysis.  Alcohol abuse. Hepatitis C.     TECHNIQUE: Radiation dose reduction techniques were utilized, including  automated exposure control and exposure modulation based on body size.   3 mm images were obtained through the abdomen and pelvis after the  administration of IV contrast. Compared with previous CT from  04/29/2014.     FINDINGS: There is moderately extensive fatty infiltration of the liver  and the liver appears enlarged. The gallbladder appears unremarkable and  there is no biliary dilatation. Splenic size is normal. There is no  change in the mild ectasia of the pancreatic duct. There is no evidence  for acute or chronic pancreatitis. There is stable hyperplasia of the  left adrenal gland. Right adrenal gland and kidneys appear unremarkable  other than a single nonobstructing stone within the left kidney. The  gastric body has a thickened edematous appearing wall. There is a  paucity of formed stool within the colon. No colonic thickening is seen.  The appendix appears normal. Uterus and left adnexa appear unremarkable.  There is a 4.6 cm right ovarian cyst, previously measuring 3.8 cm. There  is no free fluid or lymphadenopathy. There are mild-moderate abdominal  aortic atherosclerotic changes without aneurysmal dilatation.       Impression:      1. There is likely acute gastritis.  2. Moderately extensive hepatic steatosis.  3.  Gynecology follow-up is recommended for the 4.6 cm right ovarian  cyst.     Discussed with ELSA Machado.     This report was finalized on 12/1/2020 1:51 PM by Dr. Madiha Duque M.D.             Results for orders placed during the hospital encounter of 02/13/20   Duplex Renal Artery - Bilateral Complete CAR    Narrative · Normal right renal artery.  · Normal left renal artery.          Results for orders placed during the hospital encounter of 02/13/20   Duplex Renal Artery - Bilateral Complete CAR    Narrative · Normal right renal artery.  · Normal left renal artery.                 Discharge Details        Discharge Medications      New Medications      Instructions Start Date   pantoprazole 40 MG EC tablet  Commonly known as: PROTONIX   40 mg, Oral, 2 Times Daily Before Meals      sucralfate 1 g tablet  Commonly known as: CARAFATE   1 g, Oral, 4 Times Daily         Continue These Medications      Instructions Start Date   acetaminophen 325 MG tablet  Commonly known as: Tylenol   650 mg, Oral, 3 Times Daily PRN      buPROPion  MG 24 hr tablet  Commonly known as: WELLBUTRIN XL   300 mg, Oral, Daily      escitalopram 20 MG tablet  Commonly known as: LEXAPRO   20 mg, Oral, Daily      gabapentin 300 MG capsule  Commonly known as: NEURONTIN   300 mg, Oral, 3 Times Daily      lisinopril 10 MG tablet  Commonly known as: PRINIVIL,ZESTRIL   10 mg, Oral, Every 24 Hours Scheduled      NIFEdipine CC 30 MG 24 hr tablet  Commonly known as: ADALAT CC   30 mg, Oral, Every 24 Hours Scheduled      traZODone 100 MG tablet  Commonly known as: DESYREL   100 mg, Oral, Nightly         Stop These Medications    famotidine 20 MG tablet  Commonly known as: PEPCID            No Known Allergies      Discharge Disposition:  Home or Self Care    Diet:  Hospital:  No active diet order      Activity:  Activity Instructions     Activity as Tolerated                 CODE STATUS:    Code Status and Medical Interventions:   Ordered at:  12/01/20 1006     Code Status:    CPR     Medical Interventions (Level of Support Prior to Arrest):    Full         Additional Instructions for the Follow-ups that You Need to Schedule     Discharge Follow-up with PCP   As directed       Currently Documented PCP:    Provider, No Known    PCP Phone Number:    [phone]     Follow Up Details: 1 week         Discharge Follow-up with Specified Provider: GI clinic 4-6 weeks   As directed      To: GI clinic 4-6 weeks         Discharge Follow-up with Specified Provider: GYN referral made.  Call clinic with questions.; 2 Months   As directed      To: GYN referral made.  Call clinic with questions.    Follow Up: 2 Months                     Jeevan Rahman MD  12/02/20      Time Spent on Discharge:  I spent  25 minutes on this discharge activity which included: face-to-face encounter with the patient, reviewing the data in the system, coordination of the care with the nursing staff as well as consultants, documentation, and entering orders.

## 2020-12-02 NOTE — PROGRESS NOTES
Discharge Planning Assessment  [facility]     Patient Name: Ronda Steve  MRN: [id number]  Today's Date: 12/2/2020    Admit Date: 12/1/2020    Discharge Needs Assessment     Row Name 12/02/20 0912       Living Environment    Lives With  alone    Current Living Arrangements  home/apartment/condo    Primary Care Provided by  self    Provides Primary Care For  no one    Family Caregiver if Needed  parent(s)    Family Caregiver Names  Step Father  ( Sourav Pearl [phone])    Quality of Family Relationships  helpful;involved;supportive    Able to Return to Prior Arrangements  yes    Living Arrangement Comments  Pt lives alone in a two story condo.       Resource/Environmental Concerns    Resource/Environmental Concerns  none    Transportation Concerns  car, none       Transition Planning    Patient/Family Anticipates Transition to  home    Patient/Family Anticipated Services at Transition  none    Transportation Anticipated  family or friend will provide;car, drives self       Discharge Needs Assessment    Readmission Within the Last 30 Days  no previous admission in last 30 days    Equipment Currently Used at Home  none    Concerns to be Addressed  denies needs/concerns at this time    Anticipated Changes Related to Illness  none        Discharge Plan     Row Name 12/02/20 0914       Plan    Plan  Plan home.   YG Stallings RN    Patient/Family in Agreement with Plan  yes    Plan Comments  FACE SHEET VERIFIED.  Spoke with pt at bedside.  Pt's PCP is PRICE Godwin.  Pt lives alone in a two story condo.  Pt is independent with ADLs.  Pt denies the use of any DMEs.  Pt gets prescriptions at [facility]  ( [address] [facility] [address]).  Pt denies any issues affording medications.  Pt is not current with HH.  Pt has not been in SNF.  Pt denies any discharge needs.  Pt states she will have transportation home.  Plan home.   YG Stallings RN        Continued Care and Services - Admitted Since 12/1/2020     Coordination has not been started for this encounter.       Expected Discharge Date and Time     Expected Discharge Date Expected Discharge Time    Dec 2, 2020         Demographic Summary     Row Name 12/02/20 0911       General Information    Admission Type  observation    Arrived From  emergency department    Referral Source  admission list    Reason for Consult  discharge planning    Preferred Language  English     Used During This Interaction  no        Functional Status     Row Name 12/02/20 0912       Functional Status    Usual Activity Tolerance  good    Current Activity Tolerance  moderate       Functional Status, IADL    Medications  independent    Meal Preparation  independent    Housekeeping  independent    Laundry  independent    Shopping  independent       Mental Status    General Appearance WDL  WDL       Mental Status Summary    Recent Changes in Mental Status/Cognitive Functioning  no changes        Psychosocial    No documentation.       Abuse/Neglect    No documentation.       Legal    No documentation.       Substance Abuse    No documentation.       Patient Forms    No documentation.           Sandra Stallings RN

## 2020-12-02 NOTE — PROGRESS NOTES
Continued Stay Note  [facility]     Patient Name: Ronda Steve  MRN: [id number]  Today's Date: 12/2/2020    Admit Date: 12/1/2020    Discharge Plan     Row Name 12/02/20 0914       Plan    Plan  Plan home.   YG Stallings RN    Patient/Family in Agreement with Plan  yes    Plan Comments  FACE SHEET VERIFIED.  Spoke with pt at bedside.  Pt's PCP is PRICE Godwin.  Pt lives alone in a two story condo.  Pt is independent with ADLs.  Pt denies the use of any DMEs.  [facility]  ( [address] [facility] [address]).  Pt denies any issues affording medications.  Pt is not current with HH.  Pt has not been in SNF.  Pt denies any discharge needs.  Pt states she will have transportation home.  Plan home.   YG Stallings RN        Discharge Codes    No documentation.       Expected Discharge Date and Time     Expected Discharge Date Expected Discharge Time    Dec 2, 2020             Sandra Stallings, RN

## 2020-12-02 NOTE — PLAN OF CARE
Goal Outcome Evaluation:  Plan of Care Reviewed With: patient  Progress: improving   Denies pain or nausea. Tolerated regular diet. No G I bleeding noted. KCL prot x 1 dose and flu shot given. Blood pressure elevated. Pt started on home b/p meds. Telemetry SR. Pt very eager for discharge.

## 2021-02-26 RX ORDER — PANTOPRAZOLE SODIUM 40 MG/1
40 TABLET, DELAYED RELEASE ORAL
Qty: 180 TABLET | Refills: 0 | OUTPATIENT
Start: 2021-02-26

## 2021-02-26 RX ORDER — FAMOTIDINE 20 MG/1
20 TABLET, FILM COATED ORAL DAILY
Qty: 30 TABLET | Refills: 0 | OUTPATIENT
Start: 2021-02-26

## 2021-03-01 ENCOUNTER — TELEPHONE (OUTPATIENT)
Dept: OBSTETRICS AND GYNECOLOGY | Age: 66
End: 2021-03-01

## 2021-03-01 ENCOUNTER — OFFICE VISIT (OUTPATIENT)
Dept: INTERNAL MEDICINE | Facility: CLINIC | Age: 66
End: 2021-03-01

## 2021-03-01 VITALS
WEIGHT: 147 LBS | DIASTOLIC BLOOD PRESSURE: 80 MMHG | OXYGEN SATURATION: 98 % | SYSTOLIC BLOOD PRESSURE: 124 MMHG | HEART RATE: 74 BPM | TEMPERATURE: 97.1 F | RESPIRATION RATE: 18 BRPM | BODY MASS INDEX: 27.05 KG/M2 | HEIGHT: 62 IN

## 2021-03-01 DIAGNOSIS — K92.0 GASTROINTESTINAL HEMORRHAGE WITH HEMATEMESIS: Primary | ICD-10-CM

## 2021-03-01 DIAGNOSIS — H11.31 SUBCONJUNCTIVAL HEMORRHAGE OF RIGHT EYE: ICD-10-CM

## 2021-03-01 DIAGNOSIS — N83.201 CYST OF RIGHT OVARY: ICD-10-CM

## 2021-03-01 DIAGNOSIS — Z00.00 ROUTINE HEALTH MAINTENANCE: ICD-10-CM

## 2021-03-01 PROBLEM — R03.0 ELEVATED BP WITHOUT DIAGNOSIS OF HYPERTENSION: Status: RESOLVED | Noted: 2020-02-14 | Resolved: 2021-03-01

## 2021-03-01 LAB
ALBUMIN SERPL-MCNC: 4.5 G/DL (ref 3.5–5.2)
ALBUMIN/GLOB SERPL: 2 G/DL
ALP SERPL-CCNC: 145 U/L (ref 39–117)
ALT SERPL-CCNC: 14 U/L (ref 1–33)
APPEARANCE UR: CLEAR
AST SERPL-CCNC: 17 U/L (ref 1–32)
BACTERIA #/AREA URNS HPF: NORMAL /HPF
BASOPHILS # BLD AUTO: 0.04 10*3/MM3 (ref 0–0.2)
BASOPHILS NFR BLD AUTO: 0.5 % (ref 0–1.5)
BILIRUB SERPL-MCNC: 0.2 MG/DL (ref 0–1.2)
BILIRUB UR QL STRIP: NEGATIVE
BUN SERPL-MCNC: 15 MG/DL (ref 8–23)
BUN/CREAT SERPL: 19.2 (ref 7–25)
CALCIUM SERPL-MCNC: 9.6 MG/DL (ref 8.6–10.5)
CASTS URNS MICRO: NORMAL
CHLORIDE SERPL-SCNC: 101 MMOL/L (ref 98–107)
CHOLEST SERPL-MCNC: 249 MG/DL (ref 0–200)
CO2 SERPL-SCNC: 23.1 MMOL/L (ref 22–29)
COLOR UR: YELLOW
CREAT SERPL-MCNC: 0.78 MG/DL (ref 0.57–1)
EOSINOPHIL # BLD AUTO: 0.29 10*3/MM3 (ref 0–0.4)
EOSINOPHIL NFR BLD AUTO: 3.8 % (ref 0.3–6.2)
EPI CELLS #/AREA URNS HPF: NORMAL /HPF
ERYTHROCYTE [DISTWIDTH] IN BLOOD BY AUTOMATED COUNT: 12.9 % (ref 12.3–15.4)
GLOBULIN SER CALC-MCNC: 2.2 GM/DL
GLUCOSE SERPL-MCNC: 87 MG/DL (ref 65–99)
GLUCOSE UR QL: NEGATIVE
HCT VFR BLD AUTO: 41.5 % (ref 34–46.6)
HDLC SERPL-MCNC: 73 MG/DL (ref 40–60)
HGB BLD-MCNC: 14.5 G/DL (ref 12–15.9)
HGB UR QL STRIP: NEGATIVE
IMM GRANULOCYTES # BLD AUTO: 0.03 10*3/MM3 (ref 0–0.05)
IMM GRANULOCYTES NFR BLD AUTO: 0.4 % (ref 0–0.5)
KETONES UR QL STRIP: NEGATIVE
LDLC SERPL CALC-MCNC: 163 MG/DL (ref 0–100)
LDLC/HDLC SERPL: 2.21 {RATIO}
LEUKOCYTE ESTERASE UR QL STRIP: NEGATIVE
LYMPHOCYTES # BLD AUTO: 2.12 10*3/MM3 (ref 0.7–3.1)
LYMPHOCYTES NFR BLD AUTO: 28 % (ref 19.6–45.3)
Lab: NORMAL
MCH RBC QN AUTO: 33.9 PG (ref 26.6–33)
MCHC RBC AUTO-ENTMCNC: 34.9 G/DL (ref 31.5–35.7)
MCV RBC AUTO: 97 FL (ref 79–97)
MONOCYTES # BLD AUTO: 0.66 10*3/MM3 (ref 0.1–0.9)
MONOCYTES NFR BLD AUTO: 8.7 % (ref 5–12)
NEUTROPHILS # BLD AUTO: 4.43 10*3/MM3 (ref 1.7–7)
NEUTROPHILS NFR BLD AUTO: 58.6 % (ref 42.7–76)
NITRITE UR QL STRIP: NEGATIVE
NRBC BLD AUTO-RTO: 0 /100 WBC (ref 0–0.2)
PH UR STRIP: 6 [PH] (ref 5–8)
PLATELET # BLD AUTO: 339 10*3/MM3 (ref 140–450)
POTASSIUM SERPL-SCNC: 4.7 MMOL/L (ref 3.5–5.2)
PROT SERPL-MCNC: 6.7 G/DL (ref 6–8.5)
PROT UR QL STRIP: NEGATIVE
RBC # BLD AUTO: 4.28 10*6/MM3 (ref 3.77–5.28)
RBC #/AREA URNS HPF: NORMAL /HPF
SODIUM SERPL-SCNC: 137 MMOL/L (ref 136–145)
SP GR UR: 1.02 (ref 1–1.03)
TRIGL SERPL-MCNC: 75 MG/DL (ref 0–150)
TSH SERPL DL<=0.005 MIU/L-ACNC: 2.74 UIU/ML (ref 0.27–4.2)
UROBILINOGEN UR STRIP-MCNC: NORMAL MG/DL
VLDLC SERPL CALC-MCNC: 13 MG/DL (ref 5–40)
WBC # BLD AUTO: 7.57 10*3/MM3 (ref 3.4–10.8)
WBC #/AREA URNS HPF: NORMAL /HPF

## 2021-03-01 PROCEDURE — 99214 OFFICE O/P EST MOD 30 MIN: CPT | Performed by: NURSE PRACTITIONER

## 2021-03-01 RX ORDER — PANTOPRAZOLE SODIUM 40 MG/1
40 TABLET, DELAYED RELEASE ORAL DAILY
Qty: 90 TABLET | Refills: 1 | Status: SHIPPED | OUTPATIENT
Start: 2021-03-01

## 2021-03-01 NOTE — TELEPHONE ENCOUNTER
New patient referral from Katerine Isarel.  Rt. Ov Cyst.    Per Dr. May - ok to schedule in a GYN F/U slot with US prior.      Openings:   3/10 11:00  3/29 1:30 or 2:30  4/2 1:00, 1:30, or 2:30

## 2021-03-01 NOTE — PROGRESS NOTES
Subjective   Ronda Steve is a 66 y.o. female. Patient is here today for   Chief Complaint   Patient presents with   • Med Refill   • Blood Shot Eye          Vitals:    03/01/21 1101   BP: 124/80   Pulse: 74   Resp: 18   Temp: 97.1 °F (36.2 °C)   SpO2: 98%     Body mass index is 26.88 kg/m².  The following portions of the patient's history were reviewed and updated as appropriate: allergies, current medications, past family history, past medical history, past social history, past surgical history and problem list.    Past Medical History:   Diagnosis Date   • Alcoholic hepatitis    • Disease of thyroid gland    • Hepatitis C    • Hyperlipidemia       No Known Allergies   Social History     Socioeconomic History   • Marital status: Single     Spouse name: Not on file   • Number of children: Not on file   • Years of education: Not on file   • Highest education level: Not on file   Tobacco Use   • Smoking status: Current Every Day Smoker     Packs/day: 0.50     Years: 48.00     Pack years: 24.00   • Smokeless tobacco: Never Used   Substance and Sexual Activity   • Alcohol use: Yes     Alcohol/week: 7.0 - 8.0 standard drinks     Types: 4 Cans of beer, 3 - 4 Shots of liquor per week   • Drug use: Yes     Frequency: 2.0 times per week     Types: Marijuana   • Sexual activity: Defer        Current Outpatient Medications:   •  acetaminophen (TYLENOL) 325 MG tablet, Take 2 tablets by mouth 3 (Three) Times a Day As Needed for Mild Pain ., Disp: , Rfl:   •  buPROPion XL (WELLBUTRIN XL) 150 MG 24 hr tablet, Take 300 mg by mouth Daily., Disp: , Rfl:   •  escitalopram (LEXAPRO) 20 MG tablet, Take 20 mg by mouth Daily., Disp: , Rfl:   •  gabapentin (NEURONTIN) 300 MG capsule, Take 300 mg by mouth 3 (Three) Times a Day., Disp: , Rfl:   •  lisinopril (PRINIVIL,ZESTRIL) 10 MG tablet, TAKE 1 TABLET BY MOUTH DAILY, Disp: 90 tablet, Rfl: 1  •  NIFEdipine CC (ADALAT CC) 30 MG 24 hr tablet, TAKE 1 TABLET BY MOUTH DAILY, Disp: 90  "tablet, Rfl: 1  •  pantoprazole (PROTONIX) 40 MG EC tablet, Take 1 tablet by mouth Daily., Disp: 90 tablet, Rfl: 1  •  traZODone (DESYREL) 100 MG tablet, Take 100 mg by mouth Every Night., Disp: , Rfl:      Objective     History of Present Illness  Ronda Is 66 year old female patient who is here for a medication refill. She was admitted to [facility] on 12/1/20 presenting with nausea, vomiting, abdominal pain, and hematemesis.   Discharge diagnosis: Hemorrhage of stomach due to erosive gastritis, DEBI, hepatic steatosis, elevated LFT, ETOH abuse, cyst of right ovary. She has a history of alcoholism and states her last drink was on 11/30/20. She has not had a drink since. She did not go to  or rehab. She given an rx for protonix 40mg twice daily and is here today needing refills. Incidentally a 4cm ovarian cyst was seen on CT . She did not follow up with me, GI, or see gyn after her hospital visit. She states she forgot. She overall feels well. She noted her eye was \"blood shot the am\" she denies any known injury, pain, or drainage.   The following data was reviewed by: PRICE Earl on 03/01/2021:  Common labs    Common Labsle 3/19/20 3/19/20 3/19/20 12/1/20 12/1/20 12/1/20 12/2/20 12/2/20    1154 1154 1154 0631 0631 1811 0157 0157   Glucose     111 (A)   79   Glucose   84        BUN   13  17   13   Creatinine   0.85  1.46 (A)   0.75   eGFR Non  Am   67  36 (A)   78   eGFR African Am   82        Sodium   141  138   139   Potassium   4.6  3.8   3.3 (A)   Chloride   102  97 (A)   104   Calcium   9.7  9.5   8.5 (A)   Total Protein   6.6        Albumin   4.20  4.80   3.60   Total Bilirubin   0.2  0.3   0.3   Alkaline Phosphatase   130 (A)  157 (A)   107   AST (SGOT)   18  36 (A)   28   ALT (SGPT)   16  39 (A)   24   WBC  7.61  12.71 (A)   7.48    Hemoglobin  13.9  15.3  13.3 12.7    Hematocrit  41.6  44.7  40.6 36.8    Platelets  363  332   219    Total Cholesterol 224 (A)          Triglycerides 154 (A)   "        HDL Cholesterol 55          LDL Cholesterol  138 (A)          (A) Abnormal value       Comments are available for some flowsheets but are not being displayed.           Data reviewed: Radiologic studies ct abdomen, Consultant notes gi, Recent hospitalization notes 12/1/2020 and GI studies egd         Review of Systems   Constitutional: Negative for fatigue.   Eyes: Positive for redness. Negative for photophobia, pain, discharge, itching and visual disturbance.   Respiratory: Negative for cough, chest tightness, shortness of breath and wheezing.    Cardiovascular: Negative.    Gastrointestinal: Negative for abdominal pain, anal bleeding, blood in stool, diarrhea, nausea, rectal pain and vomiting.   Genitourinary: Negative.    Neurological: Negative.        Physical Exam  Vitals signs and nursing note reviewed.   HENT:      Head: Normocephalic.   Eyes:      Conjunctiva/sclera:      Right eye: Hemorrhage present.      Left eye: No hemorrhage.  Cardiovascular:      Rate and Rhythm: Normal rate and regular rhythm.      Heart sounds: Normal heart sounds.   Pulmonary:      Effort: Pulmonary effort is normal.      Breath sounds: Normal breath sounds.   Skin:     General: Skin is warm and dry.   Neurological:      Mental Status: She is alert and oriented to person, place, and time.   Psychiatric:         Attention and Perception: Attention normal.         Behavior: Behavior is cooperative.         ASSESSMENT     Problems Addressed this Visit     Gastrointestinal hemorrhage with hematemesis - Primary    Relevant Medications    pantoprazole (PROTONIX) 40 MG EC tablet    Other Relevant Orders    Ambulatory Referral to Gastroenterology    Cyst of right ovary    Relevant Orders    Ambulatory Referral to Obstetrics / Gynecology      Other Visit Diagnoses     Routine health maintenance        Relevant Orders    CBC & Differential    Comprehensive Metabolic Panel    Lipid Panel With LDL / HDL Ratio    TSH Rfx On Abnormal To  Free T4    Urinalysis With Microscopic - Urine, Clean Catch    Subconjunctival hemorrhage of right eye          Diagnoses       Codes Comments    Gastrointestinal hemorrhage with hematemesis    -  Primary ICD-10-CM: K92.0  ICD-9-CM: 578.0     Cyst of right ovary     ICD-10-CM: N83.201  ICD-9-CM: 620.2     Routine health maintenance     ICD-10-CM: Z00.00  ICD-9-CM: V70.0     Subconjunctival hemorrhage of right eye     ICD-10-CM: H11.31  ICD-9-CM: 372.72           PLAN  She is fasting today so will check labs, she is due for CPE  BP is well controlled  She denies need for any referral to help with ETOH use  protonix continued but will change from BID to daily  Advised patient to follow up with GI  Will refer to gyn for ovarian cyst   Return in about 4 weeks (around 3/29/2021) for Annual physical (labs done today) .

## 2021-03-19 ENCOUNTER — OFFICE VISIT (OUTPATIENT)
Dept: OBSTETRICS AND GYNECOLOGY | Age: 66
End: 2021-03-19

## 2021-03-19 VITALS
SYSTOLIC BLOOD PRESSURE: 126 MMHG | WEIGHT: 144 LBS | HEIGHT: 62 IN | DIASTOLIC BLOOD PRESSURE: 80 MMHG | BODY MASS INDEX: 26.5 KG/M2

## 2021-03-19 DIAGNOSIS — N83.201 CYST OF RIGHT OVARY: ICD-10-CM

## 2021-03-19 DIAGNOSIS — Z12.31 BREAST CANCER SCREENING BY MAMMOGRAM: ICD-10-CM

## 2021-03-19 DIAGNOSIS — Z01.411 ENCOUNTER FOR GYNECOLOGICAL EXAMINATION WITH ABNORMAL FINDING: Primary | ICD-10-CM

## 2021-03-19 DIAGNOSIS — N94.10 FEMALE DYSPAREUNIA: ICD-10-CM

## 2021-03-19 DIAGNOSIS — N93.0 POSTCOITAL BLEEDING: ICD-10-CM

## 2021-03-19 DIAGNOSIS — Z12.4 SCREENING FOR MALIGNANT NEOPLASM OF THE CERVIX: ICD-10-CM

## 2021-03-19 DIAGNOSIS — N76.0 BV (BACTERIAL VAGINOSIS): ICD-10-CM

## 2021-03-19 DIAGNOSIS — B96.89 BV (BACTERIAL VAGINOSIS): ICD-10-CM

## 2021-03-19 PROCEDURE — 99387 INIT PM E/M NEW PAT 65+ YRS: CPT | Performed by: OBSTETRICS & GYNECOLOGY

## 2021-03-19 NOTE — PROGRESS NOTES
"Kiersten Steve is a 66 y.o. female new patient presents for us today and f/u right ovarian cyst that was seen on CT scan in 12/2020.  Also due for annual visit today with  us  / right ovarian cyst , having pain with IC and spotting after ,due for pap and mmg , does not recall when last ones were.  TVUS today reveals stable 5 cm ovarian cyst with mixed echos. Will check CA-125. Patient reports colonoscopy 3 years ago. Reports menopause late 40's - no HRT.     History of Present Illness    The following portions of the patient's history were reviewed and updated as appropriate: allergies, current medications, past family history, past medical history, past social history, past surgical history and problem list.    Review of Systems   Constitutional: Negative for chills and fever.   Gastrointestinal: Negative for abdominal distention, abdominal pain, nausea and vomiting.   Endocrine: Negative for heat intolerance.   Genitourinary: Positive for dyspareunia. Negative for dysuria, menstrual problem, pelvic pain, vaginal bleeding, vaginal discharge and vaginal pain.        Postcoital spotting     All other systems reviewed and are negative.    /80   Ht 157.5 cm (62\")   Wt 65.3 kg (144 lb)   LMP  (LMP Unknown)   Breastfeeding No   BMI 26.34 kg/m²     Objective   Physical Exam  Vitals and nursing note reviewed.   Constitutional:       Appearance: Normal appearance. She is well-developed and normal weight.   Neck:      Thyroid: No thyromegaly.   Cardiovascular:      Rate and Rhythm: Normal rate and regular rhythm.   Pulmonary:      Effort: Pulmonary effort is normal.      Breath sounds: Normal breath sounds.   Chest:      Breasts:         Right: No mass, nipple discharge, skin change or tenderness.         Left: No mass, nipple discharge, skin change or tenderness.   Abdominal:      General: Bowel sounds are normal. There is no distension.      Palpations: Abdomen is soft.      Tenderness: There " is no abdominal tenderness.   Genitourinary:     General: Normal vulva.      Exam position: Supine.      Labia:         Right: No rash or lesion.         Left: No rash or lesion.       Vagina: No vaginal discharge.      Cervix: No friability, lesion or cervical bleeding.      Uterus: Not enlarged and not tender.       Adnexa:         Right: No mass or tenderness.          Left: No mass or tenderness.        Comments: Pale vaginal tissue c/w atrophy   Musculoskeletal:         General: Normal range of motion.      Cervical back: Normal range of motion and neck supple.   Skin:     General: Skin is warm and dry.      Findings: No rash.   Neurological:      Mental Status: She is alert and oriented to person, place, and time.   Psychiatric:         Mood and Affect: Mood normal.         Behavior: Behavior normal.           Assessment/Plan   Diagnoses and all orders for this visit:    1. Encounter for gynecological examination with abnormal finding (Primary)  -     IgP, Aptima HPV  -     NuSwab VG+ - Swab, Vagina    2. Cyst of right ovary  -       -     NuSwab VG+ - Swab, Vagina    3. Screening for malignant neoplasm of the cervix  -     IgP, Aptima HPV  -     NuSwab VG+ - Swab, Vagina    4. Female dyspareunia    5. Postcoital bleeding    6. Breast cancer screening by mammogram  -     Mammo Screening Bilateral With CAD; Future        Counseling was given to patient for the following topics: diagnostic results, instructions for management, risk factor reductions, impressions, importance of treatment compliance and self-breast exams .   Return in about 3 months (around 6/19/2021), or TVUS and f/u with me.

## 2021-03-20 LAB — CANCER AG125 SERPL-ACNC: 5.5 U/ML (ref 0–38.1)

## 2021-03-22 LAB
A VAGINAE DNA VAG QL NAA+PROBE: ABNORMAL SCORE
BVAB2 DNA VAG QL NAA+PROBE: ABNORMAL SCORE
C ALBICANS DNA VAG QL NAA+PROBE: NEGATIVE
C GLABRATA DNA VAG QL NAA+PROBE: NEGATIVE
C TRACH DNA VAG QL NAA+PROBE: NEGATIVE
MEGA1 DNA VAG QL NAA+PROBE: ABNORMAL SCORE
N GONORRHOEA DNA VAG QL NAA+PROBE: NEGATIVE
T VAGINALIS DNA VAG QL NAA+PROBE: NEGATIVE

## 2021-03-23 LAB
CYTOLOGIST CVX/VAG CYTO: NORMAL
CYTOLOGY CVX/VAG DOC CYTO: NORMAL
CYTOLOGY CVX/VAG DOC THIN PREP: NORMAL
DX ICD CODE: NORMAL
HIV 1 & 2 AB SER-IMP: NORMAL
HPV I/H RISK 4 DNA CVX QL PROBE+SIG AMP: NEGATIVE
Lab: NORMAL
OTHER STN SPEC: NORMAL
STAT OF ADQ CVX/VAG CYTO-IMP: NORMAL

## 2021-03-25 ENCOUNTER — TELEPHONE (OUTPATIENT)
Dept: OBSTETRICS AND GYNECOLOGY | Age: 66
End: 2021-03-25

## 2021-03-26 ENCOUNTER — TRANSCRIBE ORDERS (OUTPATIENT)
Dept: ADMINISTRATIVE | Facility: HOSPITAL | Age: 66
End: 2021-03-26

## 2021-03-26 DIAGNOSIS — Z12.31 VISIT FOR SCREENING MAMMOGRAM: Primary | ICD-10-CM

## 2021-05-28 RX ORDER — LISINOPRIL 10 MG/1
10 TABLET ORAL
Qty: 90 TABLET | Refills: 0 | Status: SHIPPED | OUTPATIENT
Start: 2021-05-28

## 2021-06-17 DIAGNOSIS — Z00.00 ROUTINE HEALTH MAINTENANCE: Primary | ICD-10-CM

## 2021-06-17 DIAGNOSIS — E78.00 ELEVATED CHOLESTEROL: ICD-10-CM

## 2021-06-23 LAB
ALBUMIN SERPL-MCNC: 4.4 G/DL (ref 3.5–5.2)
ALBUMIN/GLOB SERPL: 2.1 G/DL
ALP SERPL-CCNC: 146 U/L (ref 39–117)
ALT SERPL-CCNC: 56 U/L (ref 1–33)
AST SERPL-CCNC: 51 U/L (ref 1–32)
BILIRUB SERPL-MCNC: 0.2 MG/DL (ref 0–1.2)
BUN SERPL-MCNC: 9 MG/DL (ref 8–23)
BUN/CREAT SERPL: 15 (ref 7–25)
CALCIUM SERPL-MCNC: 9.8 MG/DL (ref 8.6–10.5)
CHLORIDE SERPL-SCNC: 100 MMOL/L (ref 98–107)
CHOLEST SERPL-MCNC: 175 MG/DL (ref 0–200)
CO2 SERPL-SCNC: 29 MMOL/L (ref 22–29)
CREAT SERPL-MCNC: 0.6 MG/DL (ref 0.57–1)
GLOBULIN SER CALC-MCNC: 2.1 GM/DL
GLUCOSE SERPL-MCNC: 73 MG/DL (ref 65–99)
HDLC SERPL-MCNC: 65 MG/DL (ref 40–60)
LDLC SERPL CALC-MCNC: 91 MG/DL (ref 0–100)
LDLC/HDLC SERPL: 1.37 {RATIO}
POTASSIUM SERPL-SCNC: 4.6 MMOL/L (ref 3.5–5.2)
PROT SERPL-MCNC: 6.5 G/DL (ref 6–8.5)
SODIUM SERPL-SCNC: 139 MMOL/L (ref 136–145)
TRIGL SERPL-MCNC: 104 MG/DL (ref 0–150)
VLDLC SERPL CALC-MCNC: 19 MG/DL (ref 5–40)

## 2021-06-29 ENCOUNTER — APPOINTMENT (OUTPATIENT)
Dept: WOMENS IMAGING | Facility: HOSPITAL | Age: 66
End: 2021-06-29

## 2021-06-29 ENCOUNTER — PROCEDURE VISIT (OUTPATIENT)
Dept: OBSTETRICS AND GYNECOLOGY | Age: 66
End: 2021-06-29

## 2021-06-29 DIAGNOSIS — Z12.31 VISIT FOR SCREENING MAMMOGRAM: Primary | ICD-10-CM

## 2021-06-29 PROCEDURE — 77067 SCR MAMMO BI INCL CAD: CPT | Performed by: RADIOLOGY

## 2021-06-29 PROCEDURE — 77067 SCR MAMMO BI INCL CAD: CPT | Performed by: OBSTETRICS & GYNECOLOGY

## 2021-06-29 PROCEDURE — 77063 BREAST TOMOSYNTHESIS BI: CPT | Performed by: RADIOLOGY

## 2021-06-29 PROCEDURE — 77063 BREAST TOMOSYNTHESIS BI: CPT | Performed by: OBSTETRICS & GYNECOLOGY

## 2021-07-02 DIAGNOSIS — R92.8 ABNORMALITY OF RIGHT BREAST ON SCREENING MAMMOGRAM: Primary | ICD-10-CM

## 2021-07-14 ENCOUNTER — OFFICE VISIT (OUTPATIENT)
Dept: INTERNAL MEDICINE | Facility: CLINIC | Age: 66
End: 2021-07-14

## 2021-07-14 ENCOUNTER — HOSPITAL ENCOUNTER (OUTPATIENT)
Dept: CARDIOLOGY | Facility: HOSPITAL | Age: 66
Discharge: HOME | End: 2021-07-14
Admitting: NURSE PRACTITIONER

## 2021-07-14 VITALS
RESPIRATION RATE: 18 BRPM | SYSTOLIC BLOOD PRESSURE: 160 MMHG | DIASTOLIC BLOOD PRESSURE: 90 MMHG | WEIGHT: 147 LBS | BODY MASS INDEX: 27.05 KG/M2 | HEIGHT: 62 IN | TEMPERATURE: 97.1 F | HEART RATE: 67 BPM

## 2021-07-14 DIAGNOSIS — R60.0 BILATERAL LOWER EXTREMITY EDEMA: ICD-10-CM

## 2021-07-14 DIAGNOSIS — R60.0 LOWER EXTREMITY EDEMA: ICD-10-CM

## 2021-07-14 DIAGNOSIS — R60.0 BILATERAL LOWER EXTREMITY EDEMA: Primary | ICD-10-CM

## 2021-07-14 DIAGNOSIS — I10 ESSENTIAL HYPERTENSION: ICD-10-CM

## 2021-07-14 LAB

## 2021-07-14 PROCEDURE — 99214 OFFICE O/P EST MOD 30 MIN: CPT | Performed by: NURSE PRACTITIONER

## 2021-07-14 PROCEDURE — 93970 EXTREMITY STUDY: CPT

## 2021-07-14 NOTE — PROGRESS NOTES
Vascular lab preliminary    Bilateral lower extremity venous duplex is complete    Negative DVT/SVT bilaterally    Spoke with Katerine THRASHER at 3:20pm, let patient go home    Final report to follow

## 2021-07-14 NOTE — PROGRESS NOTES
Subjective   Ronda Steve is a 66 y.o. female. Patient is here today for   Chief Complaint   Patient presents with   • Knee Pain   • Leg Pain   • Leg Swelling          Vitals:    07/14/21 1358   BP: 160/90   Pulse: 67   Resp: 18   Temp: 97.1 °F (36.2 °C)     Body mass index is 26.89 kg/m².  The following portions of the patient's history were reviewed and updated as appropriate: allergies, current medications, past family history, past medical history, past social history, past surgical history and problem list.    Past Medical History:   Diagnosis Date   • DEBI (acute kidney injury) (CMS/HCC)    • Alcoholic hepatitis    • Disease of thyroid gland    • Hepatitis C    • Hyperlipidemia       No Known Allergies   Social History     Socioeconomic History   • Marital status: Single     Spouse name: Not on file   • Number of children: Not on file   • Years of education: Not on file   • Highest education level: Not on file   Tobacco Use   • Smoking status: Current Every Day Smoker     Packs/day: 0.50     Years: 48.00     Pack years: 24.00   • Smokeless tobacco: Never Used   Vaping Use   • Vaping Use: Never used   Substance and Sexual Activity   • Alcohol use: Yes     Alcohol/week: 7.0 - 8.0 standard drinks     Types: 4 Cans of beer, 3 - 4 Shots of liquor per week   • Drug use: Yes     Frequency: 2.0 times per week     Types: Marijuana   • Sexual activity: Defer        Current Outpatient Medications:   •  acetaminophen (TYLENOL) 325 MG tablet, Take 2 tablets by mouth 3 (Three) Times a Day As Needed for Mild Pain ., Disp: , Rfl:   •  buPROPion XL (WELLBUTRIN XL) 150 MG 24 hr tablet, Take 300 mg by mouth Daily., Disp: , Rfl:   •  buPROPion XL (WELLBUTRIN XL) 300 MG 24 hr tablet, , Disp: , Rfl:   •  Clindamycin Phosphate, 1 Dose, 2 % vaginal cream, Place one applicatorful per vagina x 1, Disp: 5 g, Rfl: 0  •  escitalopram (LEXAPRO) 20 MG tablet, Take 20 mg by mouth Daily., Disp: , Rfl:   •  gabapentin (NEURONTIN) 300 MG  "capsule, Take 300 mg by mouth 3 (Three) Times a Day., Disp: , Rfl:   •  lisinopril (PRINIVIL,ZESTRIL) 10 MG tablet, TAKE 1 TABLET BY MOUTH DAILY, Disp: 90 tablet, Rfl: 0  •  NIFEdipine CC (ADALAT CC) 30 MG 24 hr tablet, TAKE 1 TABLET BY MOUTH DAILY, Disp: 90 tablet, Rfl: 1  •  pantoprazole (PROTONIX) 40 MG EC tablet, Take 1 tablet by mouth Daily., Disp: 90 tablet, Rfl: 1  •  traZODone (DESYREL) 100 MG tablet, Take 100 mg by mouth Every Night., Disp: , Rfl:      Objective     History of Present Illness  Ronda is a 66 year old female patient who is here for an acute visit. She no showed for her last 2 physicals. She c/o bilateral leg swelling and pain for 3 weeks. She denies any known injury. The swelling is worse at night. She denies history of recent surgery or travel. She denies h/o blood clotting disorder or history of gout. She has a history of ETOH abuse and intoxication and is not drinking daily but states she had \"3 beers last night but none the day before\"   She is taking tylenol otc with little relief.     Review of Systems   Constitutional: Negative for chills and fever.   Respiratory: Negative for chest tightness and shortness of breath.    Cardiovascular: Positive for leg swelling. Negative for chest pain and palpitations.   Musculoskeletal:        Bilateral leg, knee pain, swelling        Physical Exam  Vitals and nursing note reviewed.   Constitutional:       General: She is not in acute distress.     Appearance: Normal appearance. She is well-developed and well-groomed.   HENT:      Head: Normocephalic.   Cardiovascular:      Rate and Rhythm: Normal rate.      Comments: Anterior legs are red but there is no warmth or induration.   Pulmonary:      Effort: Pulmonary effort is normal.   Musculoskeletal:      Right lower le+ Pitting Edema present.      Left lower le+ Pitting Edema present.   Skin:     General: Skin is warm and dry.   Neurological:      Mental Status: She is alert. " "        ASSESSMENT     Problems Addressed this Visit     Essential hypertension      Other Visit Diagnoses     Bilateral lower extremity edema    -  Primary    Relevant Orders    Duplex Venous Lower Extremity - Bilateral CAR    CBC & Differential    Uric Acid    Comprehensive Metabolic Panel    Lower extremity edema        Relevant Orders    Duplex Venous Lower Extremity - Bilateral CAR    CBC & Differential    Uric Acid    Comprehensive Metabolic Panel      Diagnoses       Codes Comments    Bilateral lower extremity edema    -  Primary ICD-10-CM: R60.0  ICD-9-CM: 782.3     Lower extremity edema     ICD-10-CM: R60.0  ICD-9-CM: 782.3     Essential hypertension     ICD-10-CM: I10  ICD-9-CM: 401.9           PLAN    Will get labs   Will get a stat doppler to r/o DVT  BP is elevated today, pt will continue lisinopril and nifedipine   While taking the patient to the lab she asked for pain mediation. With her h/o of ETOH abuse and GI hemorrhage recommend tylenol for now, elevating legs and warm compresses. she said \"I can get that at the store, i'm fucking dying here\" in front of Christy Liu MA and Valeria Mittal . I told the patient I do not prescribe pain medication and I recommend that she go to the ER for stat work up due to the severity of her pain \"she states I wasted my time here and should have done that in the first place\" She also use profanity in front of the lab staff   Patient was taken to Vascular per DON Umaña in a wheelchair due to pain  Received call from vascular - prelim doppler report negative for DVT  Will await lab results    "

## 2021-07-15 ENCOUNTER — TELEPHONE (OUTPATIENT)
Dept: INTERNAL MEDICINE | Facility: CLINIC | Age: 66
End: 2021-07-15

## 2021-07-15 LAB
ALBUMIN SERPL-MCNC: 4.5 G/DL (ref 3.5–5.2)
ALBUMIN/GLOB SERPL: 1.9 G/DL
ALP SERPL-CCNC: 184 U/L (ref 39–117)
ALT SERPL-CCNC: 24 U/L (ref 1–33)
AST SERPL-CCNC: 25 U/L (ref 1–32)
BASOPHILS # BLD AUTO: 0.05 10*3/MM3 (ref 0–0.2)
BASOPHILS NFR BLD AUTO: 0.5 % (ref 0–1.5)
BILIRUB SERPL-MCNC: 0.3 MG/DL (ref 0–1.2)
BUN SERPL-MCNC: 8 MG/DL (ref 8–23)
BUN/CREAT SERPL: 12.5 (ref 7–25)
CALCIUM SERPL-MCNC: 10.1 MG/DL (ref 8.6–10.5)
CHLORIDE SERPL-SCNC: 98 MMOL/L (ref 98–107)
CO2 SERPL-SCNC: 23.5 MMOL/L (ref 22–29)
CREAT SERPL-MCNC: 0.64 MG/DL (ref 0.57–1)
EOSINOPHIL # BLD AUTO: 0.86 10*3/MM3 (ref 0–0.4)
EOSINOPHIL NFR BLD AUTO: 9.1 % (ref 0.3–6.2)
ERYTHROCYTE [DISTWIDTH] IN BLOOD BY AUTOMATED COUNT: 12.9 % (ref 12.3–15.4)
GLOBULIN SER CALC-MCNC: 2.4 GM/DL
GLUCOSE SERPL-MCNC: 63 MG/DL (ref 65–99)
HCT VFR BLD AUTO: 40.6 % (ref 34–46.6)
HGB BLD-MCNC: 14 G/DL (ref 12–15.9)
IMM GRANULOCYTES # BLD AUTO: 0.04 10*3/MM3 (ref 0–0.05)
IMM GRANULOCYTES NFR BLD AUTO: 0.4 % (ref 0–0.5)
LYMPHOCYTES # BLD AUTO: 2.16 10*3/MM3 (ref 0.7–3.1)
LYMPHOCYTES NFR BLD AUTO: 22.9 % (ref 19.6–45.3)
MCH RBC QN AUTO: 35.1 PG (ref 26.6–33)
MCHC RBC AUTO-ENTMCNC: 34.5 G/DL (ref 31.5–35.7)
MCV RBC AUTO: 101.8 FL (ref 79–97)
MONOCYTES # BLD AUTO: 0.89 10*3/MM3 (ref 0.1–0.9)
MONOCYTES NFR BLD AUTO: 9.4 % (ref 5–12)
NEUTROPHILS # BLD AUTO: 5.42 10*3/MM3 (ref 1.7–7)
NEUTROPHILS NFR BLD AUTO: 57.7 % (ref 42.7–76)
NRBC BLD AUTO-RTO: 0 /100 WBC (ref 0–0.2)
PLATELET # BLD AUTO: 476 10*3/MM3 (ref 140–450)
POTASSIUM SERPL-SCNC: 5.1 MMOL/L (ref 3.5–5.2)
PROT SERPL-MCNC: 6.9 G/DL (ref 6–8.5)
RBC # BLD AUTO: 3.99 10*6/MM3 (ref 3.77–5.28)
SODIUM SERPL-SCNC: 135 MMOL/L (ref 136–145)
URATE SERPL-MCNC: 6.2 MG/DL (ref 2.4–5.7)
WBC # BLD AUTO: 9.42 10*3/MM3 (ref 3.4–10.8)

## 2021-07-15 RX ORDER — METHYLPREDNISOLONE 4 MG/1
TABLET ORAL
Qty: 21 TABLET | Refills: 0 | Status: SHIPPED | OUTPATIENT
Start: 2021-07-15

## 2021-07-15 NOTE — TELEPHONE ENCOUNTER
"Called to give pt message  She does want the rx for the steroids  When ending the call she said  \"thanks for all your all help...NOT\"                    "

## 2021-07-15 NOTE — TELEPHONE ENCOUNTER
please notify patient that I reviewed her labs   Uric acid was slightly elevated - recommend rechecking this and gout diet, both of her legs were swollen and painful and I did not see any evidence of gout flare on exam  Glucose was low, I am not sure if she had recently eaten or was fasting,   Venous doppler was negative for DVT   Swelling could be from elevated BP or medication , recommend compression stockings , elevating, and drinking plenty of fluids  I can give her a steroid pack if she is experiencing any specific joint pain since she cannot take NSAIDS

## 2021-07-27 ENCOUNTER — TELEPHONE (OUTPATIENT)
Dept: OBSTETRICS AND GYNECOLOGY | Age: 66
End: 2021-07-27

## 2021-07-27 NOTE — TELEPHONE ENCOUNTER
PT needs to cancel her apt tomorrow and is asking if she needs to reschedule. Please advise. Pt has a 3 month f/u on her right ovarian cyst with TVUS.

## 2021-08-25 RX ORDER — LISINOPRIL 10 MG/1
10 TABLET ORAL
Qty: 90 TABLET | Refills: 0 | OUTPATIENT
Start: 2021-08-25

## 2024-05-09 NOTE — TELEPHONE ENCOUNTER
ID INITIAL CONSULTATION     Patient: Singh Cortés Date of Service: 2024   : 1962 MRN: [id number]     Chief Complaint   Patient presents with    Consultation     Z21 (ICD-10-CM) - HIV infection, asymptomatic  (CMD)  R76.8 (ICD-10-CM) - Low CD4 cell count determined by flow cytometry        Office Visit       Today he is accompanied by alone.     HISTORY OF PRESENT ILLNESS:  Singh Cortés is a 62 year old male, limited historian, who presents today for evaluation and further treatment of his HIV disease.  Was diagnosed about 12/10/2014.  Was diagnosed with anal cancer about 10 years ago, treated with surgery with placement of colostomy, XRT and chemotherapy, in remission.  Apparently reversal of colostomy is currently being considered.  Has been on Biktarvy for past few years he thinks, he is not sure, didn't seen a provider in  but thinks he was still getting meds refilled.  Says he takes most days..    Feels fairly well.  No fevers, chills or sweats.  No cough or SOB.  No CP.  No nausea, vomiting, diarrhea, constipation or abdominal piain.  No urinary complaints, stream is okay.  No discharge.  Just completing a 24 hour urine for protein.  No headaches, vision changes, did have lasik surgery a couple years ago.  Uses cheaters for reading.  No paresthesias.  Had gout of his feet, now on meds for this, no problems.  No depression.  Energy and appetite are okay.  Lives alone, no pets.  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  Stopped at the start of the pandemic.  Now looking for a new job.  Walks a little bit.      Had neurosyphilis with CSF VDRL of 8, # WBC, protein 59, glu 53  in 2014 at [facility] diagnosed when being assessed and early in rx of his anal cancer.  Was treated with 14 days of IV penicillin apparently in 2014. Serum RPR titer was 256 on 12/10/2024.  Was followed at possibly [facility] by ID, but has only been there 4-5 x in the last 10 years (not sure)..  There is a note  Spoke with patient and appointment schedule. Patient aware   from [facility] that he had a midline inserted up to 18 cm and was given an initial infusion of ceftriaxone 2 g with diagnosis of neurosyphilis, bt no other information available regarding this.        Review of Systems   All other systems reviewed and are negative.      PAST MEDICAL HISTORY:  Past Medical History:   Diagnosis Date    Abnormal SPEP     Anemia, unspecified type     History of anal cancer 2014    History of DVT (deep vein thrombosis) 09/21/2022    Provoked by prolonged bed    History of gout 09/21/2022    History of neurosyphilis 09/21/2022    HIV infection, asymptomatic  (CMD) 09/21/2022       PAST SURGICAL HISTORY:  Past Surgical History:   Procedure Laterality Date    Colectomy  2014    Part remv vitreous,ant apprch Bilateral     Remv 2nd cataract,corn-scler sectn Bilateral        FAMILY HISTORY:  Family History   Adopted: Yes   Family history unknown: Yes       SOCIAL HISTORY:  Social History     Tobacco Use    Smoking status: Never    Smokeless tobacco: Never   Vaping Use    Vaping status: never used   Substance Use Topics    Alcohol use: Yes     Alcohol/week: 0.0 - 1.0 standard drinks of alcohol     Comment: 1-2 glasses wine a week    Drug use: Never       PROBLEM LIST  Patient Active Problem List   Diagnosis    Deviated septum    History of neurosyphilis    History of gout    History of anal cancer    Colostomy status  (CMD)    History of DVT (deep vein thrombosis)    Colon cancer screening    History of prediabetes    Abnormal SPEP    AIDS (acquired immune deficiency syndrome)  (CMD)       MEDICATIONS:  Current Outpatient Medications   Medication Sig    bictegravir-emtricitab-tenofov (Biktarvy) -25 MG per tablet Take 1 tablet by mouth daily.    febuxostat (ULORIC) 40 MG Tab Take 40 mg by mouth daily.     No current facility-administered medications for this visit.       ALLERGIES:  ALLERGIES:   Allergen Reactions    Cat Hair Extract HIVES    Mixed Grasses Runny Nose          Visit Vitals  /85 (BP Location: LUE - Left upper extremity, Patient Position: Sitting, Cuff Size: Regular)   Pulse 71   Temp 97.8 °F (36.6 °C) (Tympanic)   Resp 18   Ht 5' 8\" (1.727 m)   Wt 75.2 kg (165 lb 12.6 oz)   SpO2 100%   BMI 25.21 kg/m²         Physical Exam  WD Slender Man in NAD  HEENT   EOM  full  PERRL  Anicteric  No petechiae  No conjunctivitis   No oral lesions  Poor dentition  Neck        No signif lymphadenopathy  Lungs       Clear to ausc  Cor           RR  nl s1s2  no murmur  Axillae      No signif lymphadenopathy  Abd           Soft  No masses, hepatomegaly  Colostomy present, bag leaking a little  Testes okay bilat  No signif inguinal nodes  Ext             No cyanosis, clubbing or edema  Varicose veins noted bilat  Nl muscle mass  Unable to examine feet, appears to have some deformity but pt declined exam  Skin           Limited  No lesions noted except over right hand knuckles, ulcerated nodular lesion (pt states was due to his gout, getting better).  Neuro         Gait slightly unsteady  No focal weakness        Most Recent Immunizations   Administered Date(s) Administered    COVID Moderna/Spikevax 12+ ([id number]) 02/15/2024    COVID Pfizer 12Y+ (Requires Dilution) 11/29/2021    COVID Pfizer Bivalent 12Y+ 09/21/2022    Influenza, Unspecified Formulation 12/22/2014    Influenza, quadrivalent, preserve-free 02/15/2024    Pneumococcal conjugate PCV20 09/21/2022       LAB RESULTS:    Office Visit on 05/06/2024   Component Date Value    Sodium 05/06/2024 142     Potassium 05/06/2024 5.1     Chloride 05/06/2024 108     Carbon Dioxide 05/06/2024 21     Anion Gap 05/06/2024 18     Glucose 05/06/2024 84     BUN 05/06/2024 35 (H)     Creatinine 05/06/2024 1.72 (H)     Glomerular Filtration Ra* 05/06/2024 45 (L)     BUN/Cr 05/06/2024 20     Calcium 05/06/2024 8.2 (L)     Bilirubin, Total 05/06/2024 0.3     GOT/AST 05/06/2024 16     GPT/ALT 05/06/2024 16     Alkaline Phosphatase 05/06/2024  90     Albumin 05/06/2024 3.2 (L)     Protein, Total 05/06/2024 8.1     Globulin 05/06/2024 4.9 (H)     A/G Ratio 05/06/2024 0.7 (L)     LD, Total 05/06/2024 179     Protein, Total 05/06/2024 8.3 (H)     Total Globulin 05/06/2024 4.4 (H)     Albumin 05/06/2024 3.9     Alpha 1 05/06/2024 0.4     Alpha 2 05/06/2024 0.8     Beta 05/06/2024 1.3 (H)     Gamma 05/06/2024 2.0 (H)     Kappa, Free 05/06/2024 16.70 (H)     Lambda, Free 05/06/2024 15.38 (H)     Kappa/ Lambda Ratio 05/06/2024 1.09     Serum Protein Electropho* 05/06/2024                      Value:  Increased total protein is nonspecific.  Increased beta region due to increased beta lipoprotein; associated with nonfasting state, lipid disorders and hepatic disease.  Polyclonal increase in gamma region is suggestive of collagen disease; chronic liver disease, chronic infection etc.         Serum Immunofixation Pat* 05/06/2024   No serum monoclonal protein identified.           Immunoglobulin G 05/06/2024 2,140 (H)     Immunoglobulin A 05/06/2024 808 (H)     Immunoglobulin M 05/06/2024 210     Beta 2 Microglobulin 05/06/2024 11.2 (H)     Ferritin 05/06/2024 105     Iron 05/06/2024 29 (L)     Iron Binding Capacity 05/06/2024 284     Iron, Percent Saturation 05/06/2024 10 (L)     Retic ABS 05/06/2024 28     Immature Retic Frac 05/06/2024 8.5     Retic Count 05/06/2024 0.7     Reticulocyte Hemoglobin * 05/06/2024 27.6 (L)     Vitamin B12 05/06/2024 238     Folate 05/06/2024 5.8     WBC 05/06/2024 3.1 (L)     RBC 05/06/2024 4.24 (L)     HGB 05/06/2024 10.1 (L)     HCT 05/06/2024 33.5 (L)     MCV 05/06/2024 79.0     MCH 05/06/2024 23.8 (L)     MCHC 05/06/2024 30.1 (L)     RDW-CV 05/06/2024 19.1 (H)     RDW-SD 05/06/2024 54.4 (H)     PLT 05/06/2024 121 (L)     NRBC 05/06/2024 0     Neutrophil, Percent 05/06/2024 55     Lymphocytes, Percent 05/06/2024 30     Mono, Percent 05/06/2024 8     Eosinophils, Percent 05/06/2024 7     Basophils, Percent 05/06/2024 0     Immature  Granulocytes 05/06/2024 0     Absolute Neutrophils 05/06/2024 1.7 (L)     Absolute Lymphocytes 05/06/2024 0.9 (L)     Absolute Monocytes 05/06/2024 0.3     Absolute Eosinophils  05/06/2024 0.2     Absolute Basophils 05/06/2024 0.0     Absolute Immature Granul* 05/06/2024 0.0     Extra Tube 05/06/2024 Hold for Add Ons     Uric Acid 05/06/2024 10.6 (H)    Lab Requisition on 02/29/2024   Component Date Value    Microalbumin, Urine 02/29/2024 2.69     Creatinine, Urine 02/29/2024 104.00     Microalbumin/ Creatinine* 02/29/2024 25.9     Protein, Urine 02/29/2024 33 (H)     Creatinine, Urine 02/29/2024 107.00     Protein/ Creatinine Ratio 02/29/2024 308 (H)     Uric Acid 02/29/2024 10.6 (H)    Lab Services on 02/20/2024   Component Date Value    Fasting Status 02/20/2024 5     Sodium 02/20/2024 142     Potassium 02/20/2024 4.8     Chloride 02/20/2024 111 (H)     Carbon Dioxide 02/20/2024 25     Anion Gap 02/20/2024 11     Glucose 02/20/2024 91     BUN 02/20/2024 24 (H)     Creatinine 02/20/2024 2.01 (H)     Glomerular Filtration Ra* 02/20/2024 37 (L)     BUN/Cr 02/20/2024 12     Calcium 02/20/2024 8.6     Bilirubin, Total 02/20/2024 0.4     GOT/AST 02/20/2024 10     GPT/ALT 02/20/2024 7     Alkaline Phosphatase 02/20/2024 76     Albumin 02/20/2024 3.3 (L)     Protein, Total 02/20/2024 8.5 (H)     Globulin 02/20/2024 5.2 (H)     A/G Ratio 02/20/2024 0.6 (L)    Orders Only on 02/16/2024   Component Date Value    COLOR, URINALYSIS 02/20/2024 Straw     APPEARANCE, URINALYSIS 02/20/2024 Clear     GLUCOSE, URINALYSIS 02/20/2024 Negative     BILIRUBIN, URINALYSIS 02/20/2024 Negative     KETONES, URINALYSIS 02/20/2024 Negative     SPECIFIC GRAVITY, URINAL* 02/20/2024 1.016     OCCULT BLOOD, URINALYSIS 02/20/2024 Negative     PH, URINALYSIS 02/20/2024 6.0     PROTEIN, URINALYSIS 02/20/2024 Trace (A)     UROBILINOGEN, URINALYSIS 02/20/2024 0.2     NITRITE, URINALYSIS 02/20/2024 Negative     LEUKOCYTE ESTERASE, URIN* 02/20/2024  Trace (A)     SQUAMOUS EPITHELIAL, URI* 02/20/2024 1 to 5     ERYTHROCYTES, URINALYSIS 02/20/2024 1 to 2     LEUKOCYTES, URINALYSIS 02/20/2024 11 to 25 (A)     BACTERIA, URINALYSIS 02/20/2024 None Seen     HYALINE CASTS, URINALYSIS 02/20/2024 None Seen     MUCUS 02/20/2024 Present     Urine, Bacterial Culture 02/20/2024 No Growth    Lab Services on 02/15/2024   Component Date Value    Absolute CD3+ (Total T c* 02/15/2024 1,430     Absolute CD3/CD4+ (T-hel* 02/15/2024 78 (L)     Absolute CD3/CD8+ (T-sup* 02/15/2024 1,346 (H)     [place]/Supressor Ratio 02/15/2024 0.1 (L)     Percent CD3+ 02/15/2024 79     Percent CD3/CD4+ 02/15/2024 4 (L)     Percent CD3/CD8+ 02/15/2024 74 (H)     Result Comment 02/15/2024      Fasting Status 02/15/2024 12     Sodium 02/15/2024 137     Potassium 02/15/2024 4.6     Chloride 02/15/2024 106     Carbon Dioxide 02/15/2024 22     Anion Gap 02/15/2024 14     Glucose 02/15/2024 110 (H)     BUN 02/15/2024 32 (H)     Creatinine 02/15/2024 1.98 (H)     Glomerular Filtration Ra* 02/15/2024 38 (L)     BUN/Cr 02/15/2024 16     Calcium 02/15/2024 8.5     Bilirubin, Total 02/15/2024 0.5     GOT/AST 02/15/2024 12     GPT/ALT 02/15/2024 7     Alkaline Phosphatase 02/15/2024 88     Albumin 02/15/2024 3.4 (L)     Protein, Total 02/15/2024 9.2 (H)     Globulin 02/15/2024 5.8 (H)     A/G Ratio 02/15/2024 0.6 (L)     HIV-1, Viral Load 02/15/2024 Not Detected     HIV-1, Log 02/15/2024 Not Detected     Procedural Notes 02/15/2024                      Value:This result contains rich text formatting which cannot be displayed here.    Cholesterol 02/15/2024 121     Triglycerides 02/15/2024 79     HDL 02/15/2024 36 (L)     LDL 02/15/2024 69     Non-HDL Cholesterol 02/15/2024 85     Cholesterol/ HDL Ratio 02/15/2024 3.4     Prostate Specific Antigen 02/15/2024 0.26     Hemoglobin A1C 02/15/2024 5.3     WBC 02/15/2024 7.3     RBC 02/15/2024 4.38 (L)     HGB 02/15/2024 10.3 (L)     HCT 02/15/2024 34.6 (L)     MCV  02/15/2024 79.0     MCH 02/15/2024 23.5 (L)     MCHC 02/15/2024 29.8 (L)     RDW-CV 02/15/2024 18.8 (H)     RDW-SD 02/15/2024 54.0 (H)     PLT 02/15/2024 209     NRBC 02/15/2024 0     Neutrophil, Percent 02/15/2024 68     Lymphocytes, Percent 02/15/2024 21     Mono, Percent 02/15/2024 6     Eosinophils, Percent 02/15/2024 4     Basophils, Percent 02/15/2024 1     Immature Granulocytes 02/15/2024 0     Absolute Neutrophils 02/15/2024 4.9     Absolute Lymphocytes 02/15/2024 1.5     Absolute Monocytes 02/15/2024 0.5     Absolute Eosinophils  02/15/2024 0.3     Absolute Basophils 02/15/2024 0.1     Absolute Immature Granul* 02/15/2024 0.0     Protein, Total 02/15/2024 9.0 (H)     Total Globulin 02/15/2024 5.3 (H)     Albumin 02/15/2024 3.7     Alpha 1 02/15/2024 0.5 (H)     Alpha 2 02/15/2024 0.9     Beta 02/15/2024 1.6 (H)     Gamma 02/15/2024 2.4 (H)     Serum Protein Electropho* 02/15/2024   Suspicious gamma region, recommend immunofixation for identification.         M Protein 1 02/15/2024 0.3 (H)     Protein, Total 02/15/2024 9.2 (H)     Total Globulin 02/15/2024 5.5 (H)     Albumin 02/15/2024 3.7     Alpha 1 02/15/2024 0.5 (H)     Alpha 2 02/15/2024 1.0 (H)     Beta 02/15/2024 1.6 (H)     Gamma 02/15/2024 2.4 (H)     Kappa, Free 02/15/2024 18.20 (H)     Lambda, Free 02/15/2024 14.60 (H)     Kappa/ Lambda Ratio 02/15/2024 1.25     Serum Protein Electropho* 02/15/2024   A monoclonal protein is present in the gamma region and measures 0.3 gm/dL.         Serum Immunofixation Pat* 02/15/2024   IgG lambda monoclonal protein is present.         Immunoglobulin G 02/15/2024 2,400 (H)     Immunoglobulin A 02/15/2024 1,190 (H)     Immunoglobulin M 02/15/2024 150        No components found for: \"TB\"    No results found for: \"RPR\", \"HIV\", \"LYMPHOCYTE\", \"HCV\"    US KIDNEY BILATERAL    Result Date: 5/1/2024  Narrative: EXAM: US KIDNEY BILATERAL CLINICAL INDICATION: Stage III chronic kidney disease. COMPARISON: CT abdomen pelvis  without contrast 9/17/2019 TECHNIQUE/FINDINGS: Using a curved array transducer, sagittal and transverse images were obtained through the kidneys and urinary bladder. Kidneys are normal in size and echogenicity with the right kidney measuring 9.4 cm and the left kidney measuring 9.5 cm in length. 1.6 cm simple cyst in the upper left kidney. Diffusely increased echogenicity of the renal pyramids without shadowing likely secondary to medullary nephrocalcinosis. No definite calculi. No hydronephrosis.  Urinary bladder is partially distended without gross abnormality.     Impression: Diffusely increased echogenicity of the renal pyramids likely secondary to medullary nephrocalcinosis. Simple left renal cyst. Electronically Signed by: KYLE TUCKER M.D. Signed on: 5/1/2024 10:49 AM Workstation ID: [id number]        ASSESSMENT AND PLAN:  This is a 62 year old year-old male who presents with .  1. AIDS (acquired immune deficiency syndrome)  (CMD)  On Biktarvy, unknown for how long he has been taking HAART regularly but currently appears to have some adherence to regimen and tolerating it well.  Had viral load a few months ago which showed good virologic suppression, will reassess today.  However, CD4 count is low, in the AIDS range.  Will recheck today and will likely need to be started on PJP prophylaxis.Results and goals discussed.  Should continue current med, stay well hydrated.  Should take MVI and try and eat nutritiously and get regular exercise.  Needs  dis cussed, given number for [facility].  Also discussed [facility], programs he might benefit from, etc.        2. History of neurosyphilis  Not a reliable historian, cannot remember having midline in in 2019 and it is unclear that he followed through for treatment then, results of CSF findings and evaluation also unknown from then.  However, he definitely had neurosyphilis with a CSF VDRL of 8 and it is unclear if this was cured or if  retreatment was tried in 2019.  We do not have syphilis serology available to us since his original diagnosis in 2014, at that time peripheral RPR was 256.  Memory is not good, and needs this evaluated.  Will need Neurology evaluation in the future as well and imaging.      3. History of anal cancer, 4. Colostomy status  (CMD)  Is being evaluated for colostomy reversal, this may help with his hygiene and overall quality of life.  Also needs regular follow up to watch for recurrence.  Has appt with Dr Amaya next month.    5. Chronic gout with tophus, unspecified cause, unspecified site  Last uric acid was over 10, says he is taking uloric.  Does have eroded tophus on his right hand.  Will recheck uric acid today.  F/u with PCP and Rheum.  May have affected his kidneys as well., ultrasound suggests nephrocalcinosis, but would wonder if it could be due to uric acid precipitation instead.      6. Stage 3 chronic kidney disease, unspecified whether stage 3a or 3b CKD  (CMD)  Should have his hyperuricemia aggressively treated probably in view of his renal issues.  Will reassess today.    7. Hypergammaglobulinemia  Being followed by Hem/Onc.  ?Role of HIV in etiology.    Orders Placed This Encounter    CD4 Count    Quantiferon TB Plus    Chlamydia/Gonorrhea by Nucleic Acid Amplification    HIV RNA Quantitative    RPR (Rapid Plasma Reagin) Traditional Syphilis Screen Algorithm    Uric Acid    Hepatitis A IgG And IgM Screen    bictegravir-emtricitab-tenofov (Biktarvy) -25 MG per tablet   Vaccinate for HBV in future (Core Ab +, Surface Ag and Ab both negative)  Vaccinate for meningococcus, shingles also in the future.    Recommend:   Refer to Orders  Multivitamin with minerals  Good nutrition and exercise  Patient education completed on disease process, etiology, and prognosis  Return to the clinic as clinically indicated as discussed with patient who verbalized understanding of and agreement with the plan.         Proper usage and side effects of medications reviewed & discussed. .      Instructions provided as documented in the AVS.    Return in about 3 weeks (around 5/30/2024).      The patient indicated understanding of the diagnosis and agreed with the plan of care.    Magdalene Valderrama MD

## 2025-01-16 NOTE — PROGRESS NOTES
Care Transitions Note    Initial Call - Call within 2 business days of discharge: Yes    First attempt to reach the patient for initial Care Transition call post hospital discharge. HIPAA compliant message left on listed mobile with CTN's contact information requesting return phone call and also reminding her of the HFU appt on 25 at 3pm.   Attempted to reach on listed home number, person answering stated it is wrong number.      Patient: Michelle Nettles    Patient : 1983   MRN: [id number]    Reason for Admission: anemia   Discharge Date: 1/15/25  RURS: Readmission Risk Score: 23.6    Last Discharge Facility       Date Complaint Diagnosis Description Type Department Provider    1/10/25 Shortness of Breath Anemia, unspecified type ... ED to Hosp-Admission (Discharged) (ADMITTED) [place] [facility]WE [facility] [place] Della Nguyen MD; Matthias Lamb MD...     Follow Up Appointment:   Patient has hospital follow up appointment scheduled within 7 days of discharge.    Future Appointments         Provider Specialty Dept Phone    2025 3:00 PM Rogers Rodríguez MD Primary Care [phone]    2025 10:30 AM Brennan Dyer MD Endocrinology [phone]        Sarah Yin RN        Case Management Discharge Note      Final Note: Pt discharged home.   YG Stallings RN         Selected Continued Care - Discharged on 12/2/2020 Admission date: 12/1/2020 - Discharge disposition: Home or Self Care    Destination    No services have been selected for the patient.              Durable Medical Equipment    No services have been selected for the patient.              Dialysis/Infusion    No services have been selected for the patient.              Home Medical Care    No services have been selected for the patient.              Therapy    No services have been selected for the patient.              Community Resources    No services have been selected for the patient.                  Transportation Services  Private: Car    Final Discharge Disposition Code: 01 - home or self-care

## (undated) DEVICE — SENSR O2 OXIMAX FNGR A/ 18IN NONSTR

## (undated) DEVICE — CANN O2 ETCO2 FITS ALL CONN CO2 SMPL A/ 7IN DISP LF

## (undated) DEVICE — LN SMPL CO2 SHTRM SD STREAM W/M LUER

## (undated) DEVICE — KT ORCA ORCAPOD DISP STRL

## (undated) DEVICE — TUBING, SUCTION, 1/4" X 10', STRAIGHT: Brand: MEDLINE

## (undated) DEVICE — FRCP BX RADJAW4 NDL 2.8 240CM LG OG BX40

## (undated) DEVICE — ADAPT CLN BIOGUARD AIR/H2O DISP

## (undated) DEVICE — BITEBLOCK OMNI BLOC

## (undated) DEVICE — THE TORRENT IRRIGATION SCOPE CONNECTOR IS USED WITH THE TORRENT IRRIGATION TUBING TO PROVIDE IRRIGATION FLUIDS SUCH AS STERILE WATER DURING GASTROINTESTINAL ENDOSCOPIC PROCEDURES WHEN USED IN CONJUNCTION WITH AN IRRIGATION PUMP (OR ELECTROSURGICAL UNIT).: Brand: TORRENT